# Patient Record
Sex: MALE | Race: WHITE | NOT HISPANIC OR LATINO | URBAN - METROPOLITAN AREA
[De-identification: names, ages, dates, MRNs, and addresses within clinical notes are randomized per-mention and may not be internally consistent; named-entity substitution may affect disease eponyms.]

---

## 2023-01-06 ENCOUNTER — INPATIENT (INPATIENT)
Facility: HOSPITAL | Age: 68
LOS: 4 days | Discharge: HOME | DRG: 690 | End: 2023-01-11
Attending: INTERNAL MEDICINE | Admitting: EMERGENCY MEDICINE
Payer: COMMERCIAL

## 2023-01-06 VITALS
TEMPERATURE: 98 F | HEART RATE: 68 BPM | OXYGEN SATURATION: 97 % | DIASTOLIC BLOOD PRESSURE: 79 MMHG | SYSTOLIC BLOOD PRESSURE: 155 MMHG | RESPIRATION RATE: 20 BRPM

## 2023-01-06 DIAGNOSIS — I25.10 ATHEROSCLEROTIC HEART DISEASE OF NATIVE CORONARY ARTERY WITHOUT ANGINA PECTORIS: ICD-10-CM

## 2023-01-06 DIAGNOSIS — N12 TUBULO-INTERSTITIAL NEPHRITIS, NOT SPECIFIED AS ACUTE OR CHRONIC: ICD-10-CM

## 2023-01-06 DIAGNOSIS — M54.9 DORSALGIA, UNSPECIFIED: ICD-10-CM

## 2023-01-06 LAB
ALBUMIN SERPL ELPH-MCNC: 4.1 G/DL — SIGNIFICANT CHANGE UP (ref 3.3–5)
ALP SERPL-CCNC: 66 U/L — SIGNIFICANT CHANGE UP (ref 40–120)
ALT FLD-CCNC: 34 U/L — SIGNIFICANT CHANGE UP (ref 12–78)
ANION GAP SERPL CALC-SCNC: 5 MMOL/L — SIGNIFICANT CHANGE UP (ref 5–17)
APPEARANCE UR: CLEAR — SIGNIFICANT CHANGE UP
AST SERPL-CCNC: 17 U/L — SIGNIFICANT CHANGE UP (ref 15–37)
BACTERIA # UR AUTO: ABNORMAL
BASOPHILS # BLD AUTO: 0.05 K/UL — SIGNIFICANT CHANGE UP (ref 0–0.2)
BASOPHILS NFR BLD AUTO: 0.5 % — SIGNIFICANT CHANGE UP (ref 0–2)
BILIRUB SERPL-MCNC: 1.1 MG/DL — SIGNIFICANT CHANGE UP (ref 0.2–1.2)
BILIRUB UR-MCNC: NEGATIVE — SIGNIFICANT CHANGE UP
BUN SERPL-MCNC: 21 MG/DL — SIGNIFICANT CHANGE UP (ref 7–23)
CALCIUM SERPL-MCNC: 9.4 MG/DL — SIGNIFICANT CHANGE UP (ref 8.5–10.1)
CHLORIDE SERPL-SCNC: 105 MMOL/L — SIGNIFICANT CHANGE UP (ref 96–108)
CO2 SERPL-SCNC: 29 MMOL/L — SIGNIFICANT CHANGE UP (ref 22–31)
COLOR SPEC: SIGNIFICANT CHANGE UP
CREAT SERPL-MCNC: 1.05 MG/DL — SIGNIFICANT CHANGE UP (ref 0.5–1.3)
DIFF PNL FLD: NEGATIVE — SIGNIFICANT CHANGE UP
EGFR: 77 ML/MIN/1.73M2 — SIGNIFICANT CHANGE UP
EOSINOPHIL # BLD AUTO: 0.2 K/UL — SIGNIFICANT CHANGE UP (ref 0–0.5)
EOSINOPHIL NFR BLD AUTO: 2.1 % — SIGNIFICANT CHANGE UP (ref 0–6)
EPI CELLS # UR: SIGNIFICANT CHANGE UP
FLUAV AG NPH QL: SIGNIFICANT CHANGE UP
FLUBV AG NPH QL: SIGNIFICANT CHANGE UP
GLUCOSE SERPL-MCNC: 113 MG/DL — HIGH (ref 70–99)
GLUCOSE UR QL: NEGATIVE — SIGNIFICANT CHANGE UP
HCT VFR BLD CALC: 49 % — SIGNIFICANT CHANGE UP (ref 39–50)
HGB BLD-MCNC: 16.4 G/DL — SIGNIFICANT CHANGE UP (ref 13–17)
IMM GRANULOCYTES NFR BLD AUTO: 0.4 % — SIGNIFICANT CHANGE UP (ref 0–0.9)
KETONES UR-MCNC: NEGATIVE — SIGNIFICANT CHANGE UP
LEUKOCYTE ESTERASE UR-ACNC: ABNORMAL
LIDOCAIN IGE QN: 94 U/L — SIGNIFICANT CHANGE UP (ref 73–393)
LYMPHOCYTES # BLD AUTO: 1.59 K/UL — SIGNIFICANT CHANGE UP (ref 1–3.3)
LYMPHOCYTES # BLD AUTO: 16.5 % — SIGNIFICANT CHANGE UP (ref 13–44)
MCHC RBC-ENTMCNC: 30.9 PG — SIGNIFICANT CHANGE UP (ref 27–34)
MCHC RBC-ENTMCNC: 33.5 GM/DL — SIGNIFICANT CHANGE UP (ref 32–36)
MCV RBC AUTO: 92.5 FL — SIGNIFICANT CHANGE UP (ref 80–100)
MONOCYTES # BLD AUTO: 0.67 K/UL — SIGNIFICANT CHANGE UP (ref 0–0.9)
MONOCYTES NFR BLD AUTO: 7 % — SIGNIFICANT CHANGE UP (ref 2–14)
NEUTROPHILS # BLD AUTO: 7.09 K/UL — SIGNIFICANT CHANGE UP (ref 1.8–7.4)
NEUTROPHILS NFR BLD AUTO: 73.5 % — SIGNIFICANT CHANGE UP (ref 43–77)
NITRITE UR-MCNC: NEGATIVE — SIGNIFICANT CHANGE UP
PH UR: 7 — SIGNIFICANT CHANGE UP (ref 5–8)
PLATELET # BLD AUTO: 195 K/UL — SIGNIFICANT CHANGE UP (ref 150–400)
POTASSIUM SERPL-MCNC: 4.5 MMOL/L — SIGNIFICANT CHANGE UP (ref 3.5–5.3)
POTASSIUM SERPL-SCNC: 4.5 MMOL/L — SIGNIFICANT CHANGE UP (ref 3.5–5.3)
PROT SERPL-MCNC: 8.1 GM/DL — SIGNIFICANT CHANGE UP (ref 6–8.3)
PROT UR-MCNC: NEGATIVE — SIGNIFICANT CHANGE UP
RBC # BLD: 5.3 M/UL — SIGNIFICANT CHANGE UP (ref 4.2–5.8)
RBC # FLD: 12.6 % — SIGNIFICANT CHANGE UP (ref 10.3–14.5)
RBC CASTS # UR COMP ASSIST: SIGNIFICANT CHANGE UP /HPF (ref 0–4)
RSV RNA NPH QL NAA+NON-PROBE: SIGNIFICANT CHANGE UP
SARS-COV-2 RNA SPEC QL NAA+PROBE: SIGNIFICANT CHANGE UP
SODIUM SERPL-SCNC: 139 MMOL/L — SIGNIFICANT CHANGE UP (ref 135–145)
SP GR SPEC: 1 — LOW (ref 1.01–1.02)
TROPONIN I, HIGH SENSITIVITY RESULT: 7.36 NG/L — SIGNIFICANT CHANGE UP
UROBILINOGEN FLD QL: NEGATIVE — SIGNIFICANT CHANGE UP
WBC # BLD: 9.64 K/UL — SIGNIFICANT CHANGE UP (ref 3.8–10.5)
WBC # FLD AUTO: 9.64 K/UL — SIGNIFICANT CHANGE UP (ref 3.8–10.5)
WBC UR QL: ABNORMAL /HPF (ref 0–5)

## 2023-01-06 PROCEDURE — 86901 BLOOD TYPING SEROLOGIC RH(D): CPT

## 2023-01-06 PROCEDURE — 99222 1ST HOSP IP/OBS MODERATE 55: CPT

## 2023-01-06 PROCEDURE — 93005 ELECTROCARDIOGRAM TRACING: CPT

## 2023-01-06 PROCEDURE — 74177 CT ABD & PELVIS W/CONTRAST: CPT | Mod: 26,MD

## 2023-01-06 PROCEDURE — 99285 EMERGENCY DEPT VISIT HI MDM: CPT

## 2023-01-06 PROCEDURE — 97116 GAIT TRAINING THERAPY: CPT | Mod: GP

## 2023-01-06 PROCEDURE — 86900 BLOOD TYPING SEROLOGIC ABO: CPT

## 2023-01-06 PROCEDURE — 0241U: CPT

## 2023-01-06 PROCEDURE — 97530 THERAPEUTIC ACTIVITIES: CPT | Mod: GP

## 2023-01-06 PROCEDURE — 72148 MRI LUMBAR SPINE W/O DYE: CPT | Mod: MA

## 2023-01-06 PROCEDURE — 85027 COMPLETE CBC AUTOMATED: CPT

## 2023-01-06 PROCEDURE — 80048 BASIC METABOLIC PNL TOTAL CA: CPT

## 2023-01-06 PROCEDURE — 85730 THROMBOPLASTIN TIME PARTIAL: CPT

## 2023-01-06 PROCEDURE — 36415 COLL VENOUS BLD VENIPUNCTURE: CPT

## 2023-01-06 PROCEDURE — 86850 RBC ANTIBODY SCREEN: CPT

## 2023-01-06 PROCEDURE — 85610 PROTHROMBIN TIME: CPT

## 2023-01-06 RX ORDER — METOPROLOL TARTRATE 50 MG
50 TABLET ORAL DAILY
Refills: 0 | Status: DISCONTINUED | OUTPATIENT
Start: 2023-01-06 | End: 2023-01-11

## 2023-01-06 RX ORDER — SODIUM CHLORIDE 9 MG/ML
1000 INJECTION INTRAMUSCULAR; INTRAVENOUS; SUBCUTANEOUS
Refills: 0 | Status: DISCONTINUED | OUTPATIENT
Start: 2023-01-06 | End: 2023-01-11

## 2023-01-06 RX ORDER — SODIUM CHLORIDE 9 MG/ML
1000 INJECTION INTRAMUSCULAR; INTRAVENOUS; SUBCUTANEOUS ONCE
Refills: 0 | Status: COMPLETED | OUTPATIENT
Start: 2023-01-06 | End: 2023-01-06

## 2023-01-06 RX ORDER — OXYCODONE HYDROCHLORIDE 5 MG/1
2.5 TABLET ORAL EVERY 4 HOURS
Refills: 0 | Status: DISCONTINUED | OUTPATIENT
Start: 2023-01-06 | End: 2023-01-08

## 2023-01-06 RX ORDER — INFLUENZA VIRUS VACCINE 15; 15; 15; 15 UG/.5ML; UG/.5ML; UG/.5ML; UG/.5ML
0.7 SUSPENSION INTRAMUSCULAR ONCE
Refills: 0 | Status: DISCONTINUED | OUTPATIENT
Start: 2023-01-06 | End: 2023-01-11

## 2023-01-06 RX ORDER — ACETAMINOPHEN 500 MG
650 TABLET ORAL EVERY 6 HOURS
Refills: 0 | Status: DISCONTINUED | OUTPATIENT
Start: 2023-01-06 | End: 2023-01-11

## 2023-01-06 RX ORDER — CEFTRIAXONE 500 MG/1
1000 INJECTION, POWDER, FOR SOLUTION INTRAMUSCULAR; INTRAVENOUS EVERY 24 HOURS
Refills: 0 | Status: DISCONTINUED | OUTPATIENT
Start: 2023-01-07 | End: 2023-01-11

## 2023-01-06 RX ORDER — NALOXONE HYDROCHLORIDE 4 MG/.1ML
0.4 SPRAY NASAL ONCE
Refills: 0 | Status: DISCONTINUED | OUTPATIENT
Start: 2023-01-06 | End: 2023-01-11

## 2023-01-06 RX ORDER — CEFTRIAXONE 500 MG/1
1000 INJECTION, POWDER, FOR SOLUTION INTRAMUSCULAR; INTRAVENOUS ONCE
Refills: 0 | Status: DISCONTINUED | OUTPATIENT
Start: 2023-01-06 | End: 2023-01-06

## 2023-01-06 RX ORDER — KETOROLAC TROMETHAMINE 30 MG/ML
30 SYRINGE (ML) INJECTION ONCE
Refills: 0 | Status: DISCONTINUED | OUTPATIENT
Start: 2023-01-06 | End: 2023-01-06

## 2023-01-06 RX ORDER — MORPHINE SULFATE 50 MG/1
4 CAPSULE, EXTENDED RELEASE ORAL ONCE
Refills: 0 | Status: DISCONTINUED | OUTPATIENT
Start: 2023-01-06 | End: 2023-01-06

## 2023-01-06 RX ORDER — ASPIRIN/CALCIUM CARB/MAGNESIUM 324 MG
81 TABLET ORAL DAILY
Refills: 0 | Status: DISCONTINUED | OUTPATIENT
Start: 2023-01-06 | End: 2023-01-08

## 2023-01-06 RX ORDER — POLYETHYLENE GLYCOL 3350 17 G/17G
17 POWDER, FOR SOLUTION ORAL DAILY
Refills: 0 | Status: DISCONTINUED | OUTPATIENT
Start: 2023-01-06 | End: 2023-01-11

## 2023-01-06 RX ORDER — AMLODIPINE BESYLATE 2.5 MG/1
5 TABLET ORAL DAILY
Refills: 0 | Status: DISCONTINUED | OUTPATIENT
Start: 2023-01-06 | End: 2023-01-11

## 2023-01-06 RX ORDER — CYCLOBENZAPRINE HYDROCHLORIDE 10 MG/1
5 TABLET, FILM COATED ORAL
Refills: 0 | Status: DISCONTINUED | OUTPATIENT
Start: 2023-01-06 | End: 2023-01-07

## 2023-01-06 RX ORDER — OXYCODONE HYDROCHLORIDE 5 MG/1
5 TABLET ORAL EVERY 4 HOURS
Refills: 0 | Status: DISCONTINUED | OUTPATIENT
Start: 2023-01-06 | End: 2023-01-08

## 2023-01-06 RX ORDER — LANOLIN ALCOHOL/MO/W.PET/CERES
3 CREAM (GRAM) TOPICAL AT BEDTIME
Refills: 0 | Status: DISCONTINUED | OUTPATIENT
Start: 2023-01-06 | End: 2023-01-11

## 2023-01-06 RX ORDER — ATORVASTATIN CALCIUM 80 MG/1
80 TABLET, FILM COATED ORAL AT BEDTIME
Refills: 0 | Status: DISCONTINUED | OUTPATIENT
Start: 2023-01-06 | End: 2023-01-11

## 2023-01-06 RX ORDER — KETOROLAC TROMETHAMINE 30 MG/ML
15 SYRINGE (ML) INJECTION EVERY 8 HOURS
Refills: 0 | Status: DISCONTINUED | OUTPATIENT
Start: 2023-01-06 | End: 2023-01-06

## 2023-01-06 RX ORDER — CEFTRIAXONE 500 MG/1
1000 INJECTION, POWDER, FOR SOLUTION INTRAMUSCULAR; INTRAVENOUS ONCE
Refills: 0 | Status: COMPLETED | OUTPATIENT
Start: 2023-01-06 | End: 2023-01-06

## 2023-01-06 RX ORDER — PANTOPRAZOLE SODIUM 20 MG/1
40 TABLET, DELAYED RELEASE ORAL
Refills: 0 | Status: DISCONTINUED | OUTPATIENT
Start: 2023-01-06 | End: 2023-01-11

## 2023-01-06 RX ORDER — ONDANSETRON 8 MG/1
4 TABLET, FILM COATED ORAL EVERY 8 HOURS
Refills: 0 | Status: DISCONTINUED | OUTPATIENT
Start: 2023-01-06 | End: 2023-01-11

## 2023-01-06 RX ORDER — SENNA PLUS 8.6 MG/1
2 TABLET ORAL AT BEDTIME
Refills: 0 | Status: DISCONTINUED | OUTPATIENT
Start: 2023-01-06 | End: 2023-01-11

## 2023-01-06 RX ORDER — LIDOCAINE 4 G/100G
1 CREAM TOPICAL DAILY
Refills: 0 | Status: DISCONTINUED | OUTPATIENT
Start: 2023-01-06 | End: 2023-01-11

## 2023-01-06 RX ORDER — METHOCARBAMOL 500 MG/1
750 TABLET, FILM COATED ORAL ONCE
Refills: 0 | Status: COMPLETED | OUTPATIENT
Start: 2023-01-06 | End: 2023-01-06

## 2023-01-06 RX ADMIN — AMLODIPINE BESYLATE 5 MILLIGRAM(S): 2.5 TABLET ORAL at 21:22

## 2023-01-06 RX ADMIN — SODIUM CHLORIDE 1000 MILLILITER(S): 9 INJECTION INTRAMUSCULAR; INTRAVENOUS; SUBCUTANEOUS at 13:16

## 2023-01-06 RX ADMIN — Medication 30 MILLIGRAM(S): at 14:06

## 2023-01-06 RX ADMIN — CEFTRIAXONE 1000 MILLIGRAM(S): 500 INJECTION, POWDER, FOR SOLUTION INTRAMUSCULAR; INTRAVENOUS at 15:45

## 2023-01-06 RX ADMIN — METHOCARBAMOL 750 MILLIGRAM(S): 500 TABLET, FILM COATED ORAL at 13:35

## 2023-01-06 RX ADMIN — SODIUM CHLORIDE 1000 MILLILITER(S): 9 INJECTION INTRAMUSCULAR; INTRAVENOUS; SUBCUTANEOUS at 12:16

## 2023-01-06 RX ADMIN — OXYCODONE HYDROCHLORIDE 5 MILLIGRAM(S): 5 TABLET ORAL at 21:22

## 2023-01-06 RX ADMIN — MORPHINE SULFATE 4 MILLIGRAM(S): 50 CAPSULE, EXTENDED RELEASE ORAL at 12:44

## 2023-01-06 RX ADMIN — MORPHINE SULFATE 4 MILLIGRAM(S): 50 CAPSULE, EXTENDED RELEASE ORAL at 12:14

## 2023-01-06 RX ADMIN — SODIUM CHLORIDE 125 MILLILITER(S): 9 INJECTION INTRAMUSCULAR; INTRAVENOUS; SUBCUTANEOUS at 15:55

## 2023-01-06 RX ADMIN — ATORVASTATIN CALCIUM 80 MILLIGRAM(S): 80 TABLET, FILM COATED ORAL at 21:23

## 2023-01-06 RX ADMIN — OXYCODONE HYDROCHLORIDE 5 MILLIGRAM(S): 5 TABLET ORAL at 22:30

## 2023-01-06 RX ADMIN — LIDOCAINE 1 PATCH: 4 CREAM TOPICAL at 21:23

## 2023-01-06 RX ADMIN — Medication 30 MILLIGRAM(S): at 13:36

## 2023-01-06 RX ADMIN — Medication 3 MILLIGRAM(S): at 21:24

## 2023-01-06 RX ADMIN — Medication 50 MILLIGRAM(S): at 21:22

## 2023-01-06 NOTE — ED ADULT TRIAGE NOTE - CHIEF COMPLAINT QUOTE
Pt arrives to ED complaining of non traumatic right lower back pain radiating down right leg for four days. Hx HTN.

## 2023-01-06 NOTE — ED STATDOCS - MUSCULOSKELETAL, MLM
range of motion is not limited. TTP to right gluteal area. range of motion is not limited. TTP to right gluteal area. 5/5 strength on flexion and extension of all limbs. No nuchal rigidity. No saddle anesthesia.

## 2023-01-06 NOTE — ED STATDOCS - OBJECTIVE STATEMENT
66 y/o male w/ a PMHx of HTN, HLD, MI, and CAD x4 presents to the ED c/o lower back pain radiating to the right leg, right abd, and groin x4 days starting after bending over to  something. Pt states his right leg feels stiff and since this morning he has difficulty ambulating secondary to the pain/stiffness.  Denies any trauma, fall, or injury. Pt reports he had similar pain before it has never been this severe. Pt endorses taking Alleve w/o improvement. Denies bladder/bowel incontinence, urinary Sx, focal weakness, CP, SOB, palpitations, or numbness. Pt able to range right leg. No other complaints at this time. Cardio: Dr. Solano in [place]. Allergies: Penicillin and Sulfa. 68 y/o male w/ a PMHx of HTN, HLD, MI, and CAD x4 presents to the ED c/o lower back pain, flank pain, radiating to the right leg, right abd, and groin x4 days starting after bending over to  something. Pt states his right leg feels stiff and since this morning he has difficulty ambulating secondary to the pain/stiffness.  Denies any trauma, fall, or injury. Pt reports he had similar pain before it has never been this severe. Pt endorses taking Alleve w/o improvement. Denies bladder/bowel incontinence, urinary Sx, focal weakness, CP, SOB, palpitations, or numbness. Pt able to range right leg. No other complaints at this time. Cardio: Dr. Solano in [place]. Allergies: Penicillin and Sulfa. flank pain is constant, not pleuritic, no cp, sob or palpitation, no neck pain, no visual or focal neurological complaints. No melena or hematochezia. No dysuria hematuria or frequency.

## 2023-01-06 NOTE — ED STATDOCS - DIFFERENTIAL DIAGNOSIS
UTI, pyelonephritis as causes. vs. muscular pain vs. neuropathic spinal pain, unable to ambulate without extreme pain, labs, CT, further pain management, resolution of pyelo potential ortho-spine eval. Differential Diagnosis

## 2023-01-06 NOTE — ED STATDOCS - NS ED ATTENDING STATEMENT MOD
This was a shared visit with the RITU. I reviewed and verified the documentation and independently performed the documented:

## 2023-01-06 NOTE — ED STATDOCS - CONSTITUTIONAL, MLM
normal... well appearing and in no apparent distress. well appearing and in no apparent distress. Nontoxic appearing. AAOx4. mild distress due to pain in the flank and back.

## 2023-01-06 NOTE — ED STATDOCS - NEUROLOGICAL, MLM
5/5 strength. No saddle anesthesia. 5/5 strength. No saddle anesthesia. CNs 2-12 intact. NIH=0 GCS=15 intact sensation and proprioception.

## 2023-01-06 NOTE — ED STATDOCS - ATTENDING APP SHARED VISIT CONTRIBUTION OF CARE
I Magen Cox MD saw and examined the patient. MLP saw and examined the patient under my supervision. I discussed the care of the patient with MLP and agree with MLP's plan, assessment and care of the patient while in the ED.

## 2023-01-06 NOTE — H&P ADULT - HISTORY OF PRESENT ILLNESS
66 y/o male w/ a PMHx of HTN, HLD, MI, and CAD x4 presents to the ED c/o lower back pain radiating to the right leg, right abd, and groin x4 days starting after bending over to  something. Pt states his right leg feels stiff and since this morning he has difficulty ambulating secondary to the pain/stiffness.  Denies any trauma, fall, or injury. Pt reports he had similar pain before it has never been this severe. Pt endorses taking Alleve w/o improvement. Denies bladder/bowel incontinence, urinary Sx, focal weakness, CP, SOB, palpitations, or numbness. Pt able to range right leg. No other complaints at this time. . Allergies: Penicillin and Sulfa 66 y/o male w/ a PMHx of HTN, HLD, MI, and CAD x4 presents to the ED c/o lower back pain radiating to the right leg, right abd, and groin x4 days starting after bending over to  something. Pt states his right leg feels stiff and since this morning he has difficulty ambulating secondary to the pain/stiffness.  Denies any trauma, fall, or injury. Pt reports he had similar pain before it has never been this severe. Pt endorses taking Alleve w/o improvement. Denies bladder/bowel incontinence, urinary Sx, focal extremity weakness, CP, SOB, palpitations, or numbness No other complaints at this time. . Allergies: Penicillin and Sulfa from childhood told by his mother

## 2023-01-06 NOTE — H&P ADULT - ASSESSMENT
Multiple comorbidities presents with back pain, found to have CT imaging suggestive for pyelonephritis    - admit to med surg  - IV antibiotics  - Encourage PO increased fluids  - Follow urine and blood cultures  - Pain management, work up wo continue as outpatient  - Continue home meds Multiple comorbidities presents with back pain Lumbar strain with sciatica, found to have CT imaging suggestive for pyelonephritis, no urinary symptoms    - Admit to med surg  - IV antibiotics   - Encourage PO increased fluids  - Follow urine and blood cultures  - Solumedrol, to taper, for sciatica  - Pain management, work up to continue as outpatient  - Continue home meds Multiple comorbidities presents with back pain Lumbar strain with sciatica, found to have CT imaging suggestive for pyelonephritis, no urinary symptoms    - Admit to med surg  - IV antibiotics Ceftriaxone  - Encourage PO increased fluids  - Follow urine and blood cultures  - Solumedrol, to taper, for sciatica  - Pain management med regimen ordered, work up to continue as outpatient  - Check Lumbar xray  - Continue home meds

## 2023-01-06 NOTE — ED ADULT NURSE NOTE - OBJECTIVE STATEMENT
patient axox3, c/o pain radiating from lower back to lower right leg. patient states he bend over and picked something up 5 days ago and has been having pain since. patient unable to ambulate due to pain. patient denies headache, vision changes, n/v/d, fever, chills, cough, chest pain, SOB. color good, skin warm and dry, respirations even and unlabored. patient able to speak in full sentences.

## 2023-01-06 NOTE — ED STATDOCS - CLINICAL SUMMARY MEDICAL DECISION MAKING FREE TEXT BOX
CT, labs, pain management, and reassess. Will r/o retroperitoneal bleeding, fracture, and appendicitis. CT, labs, pain management, and reassess. Will r/o retroperitoneal bleeding, fracture, and appendicitis.    UTI, pyelonephritis as causes. vs. muscular pain vs. neuropathic spinal pain, unable to ambulate without extreme pain, labs, CT, further pain management, resolution of pyelo potential ortho-spine eval.

## 2023-01-06 NOTE — H&P ADULT - NSICDXPASTMEDICALHX_GEN_ALL_CORE_FT
PAST MEDICAL HISTORY:  CAD (coronary artery disease)     HTN (hypertension)       HTN (hypertension)

## 2023-01-06 NOTE — ED STATDOCS - CARE PLAN
1 Principal Discharge DX:	Pyelonephritis  Secondary Diagnosis:	Back pain   Principal Discharge DX:	Pyelonephritis  Goal:	intractable flank pain, back pain, unable to ambulate  Secondary Diagnosis:	Back pain

## 2023-01-06 NOTE — H&P ADULT - NSHPLABSRESULTS_GEN_ALL_CORE
16.4   9.64  )-----------( 195      ( 2023 11:30 )             49.0     -    139  |  105  |  21  ----------------------------<  113<H>  4.5   |  29  |  1.05    Ca    9.4      2023 11:30    TPro  8.1  /  Alb  4.1  /  TBili  1.1  /  DBili  x   /  AST  17  /  ALT  34  /  AlkPhos  66  -06        LIVER FUNCTIONS - ( 2023 11:30 )  Alb: 4.1 g/dL / Pro: 8.1 gm/dL / ALK PHOS: 66 U/L / ALT: 34 U/L / AST: 17 U/L / GGT: x             Urinalysis Basic - ( 2023 13:24 )    Color: Pale Yellow / Appearance: Clear / S.005 / pH: x  Gluc: x / Ketone: Negative  / Bili: Negative / Urobili: Negative   Blood: x / Protein: Negative / Nitrite: Negative   Leuk Esterase: Moderate / RBC: 0-2 /HPF / WBC 26-50 /HPF   Sq Epi: x / Non Sq Epi: Occasional / Bacteria: Few          Blood, Urine: Negative ( @ 13:24)

## 2023-01-06 NOTE — ED STATDOCS - NEURO NEGATIVE STATEMENT, MLM
no loss of consciousness, no gait abnormality, no headache, no sensory deficits, and no weakness. No saddle anaesthesia. no incontinence of urine or stool.

## 2023-01-06 NOTE — H&P ADULT - NSHPPHYSICALEXAM_GEN_ALL_CORE
HEENT:   pupils equal and reactive, EOMI, no oropharyngeal lesions, erythema, exudates, oral thrush    NECK:   supple, no carotid bruits, no palpable lymph nodes, no thyromegaly    CV:  +S1, +S2, regular, no murmurs or rubs    RESP:   lungs clear to auscultation bilaterally, no wheezing, rales, rhonchi, good air entry bilaterally    BREAST:  not examined    GI:  abdomen soft, non-tender, non-distended, normal BS, no bruits, no abdominal masses, no palpable masses    RECTAL:  not examined    :  not examined    MSK:   normal muscle tone, no atrophy, no rigidity, no contractions    EXT:   no clubbing, no cyanosis, no edema, no calf pain, swelling or erythema    VASCULAR:  pulses equal and symmetric in the upper and lower extremities    NEURO:  AAOX3, no focal neurological deficits, follows all commands, able to move extremities spontaneously    SKIN:  no ulcers, lesions or rashes HEENT:   pupils equal and reactive, EOMI, no oropharyngeal lesions, erythema, exudates, oral thrush    NECK:   supple, no carotid bruits, no palpable lymph nodes, no thyromegaly    CV:  +S1, +S2, regular, no murmurs or rubs    RESP:   lungs clear to auscultation bilaterally, no wheezing, rales, rhonchi, good air entry bilaterally    BREAST:  not examined    GI:  abdomen soft, non-tender, non-distended, normal BS, no bruits, no abdominal masses, no palpable masses    RECTAL:  not examined    :  not examined    MSK:  tender L/S spine, SLR negative, DTR 2+ bilateral, no CVAT    EXT:   no clubbing, no cyanosis, no edema, no calf pain, swelling or erythema    VASCULAR:  pulses equal and symmetric in the upper and lower extremities    NEURO:  AAOX3, no focal neurological deficits, follows all commands, able to move extremities spontaneously    SKIN:  no ulcers, lesions or rashes

## 2023-01-06 NOTE — ED STATDOCS - GASTROINTESTINAL, MLM
abdomen soft, RLQ TTP, and non-distended. Bowel sounds present. abdomen soft, RLQ TTP, and non-distended. Bowel sounds present. Rt flank CVAT.

## 2023-01-06 NOTE — ED STATDOCS - PROGRESS NOTE DETAILS
pt having difficulty ambulating in the ed after pain meds, will treat for Pyelonephritis and control pain, will admit for further management. pt agrees with plan and well appearing on dc. -Dali Smith PA-C

## 2023-01-06 NOTE — ED STATDOCS - NSICDXPASTMEDICALHX_GEN_ALL_CORE_FT
PAST MEDICAL HISTORY:  HTN (hypertension)      PAST MEDICAL HISTORY:  CAD (coronary artery disease)     HTN (hypertension)       HTN (hypertension)

## 2023-01-06 NOTE — ED STATDOCS - NS_ ATTENDINGSCRIBEDETAILS _ED_A_ED_FT
I Magen Cox MD saw and examined the patient. Scribe documented for me and under my supervision. I have modified the scribe's documentation where necessary to reflect my history, physical exam and other relevant documentations pertinent to the care of the patient.

## 2023-01-07 LAB
BASOPHILS # BLD AUTO: 0.03 K/UL — SIGNIFICANT CHANGE UP (ref 0–0.2)
BASOPHILS NFR BLD AUTO: 0.4 % — SIGNIFICANT CHANGE UP (ref 0–2)
CULTURE RESULTS: NO GROWTH — SIGNIFICANT CHANGE UP
EOSINOPHIL # BLD AUTO: 0.2 K/UL — SIGNIFICANT CHANGE UP (ref 0–0.5)
EOSINOPHIL NFR BLD AUTO: 2.4 % — SIGNIFICANT CHANGE UP (ref 0–6)
HCT VFR BLD CALC: 41.9 % — SIGNIFICANT CHANGE UP (ref 39–50)
HCV AB S/CO SERPL IA: 0.09 S/CO — SIGNIFICANT CHANGE UP (ref 0–0.99)
HCV AB SERPL-IMP: SIGNIFICANT CHANGE UP
HGB BLD-MCNC: 13.7 G/DL — SIGNIFICANT CHANGE UP (ref 13–17)
IMM GRANULOCYTES NFR BLD AUTO: 0.2 % — SIGNIFICANT CHANGE UP (ref 0–0.9)
LYMPHOCYTES # BLD AUTO: 1.63 K/UL — SIGNIFICANT CHANGE UP (ref 1–3.3)
LYMPHOCYTES # BLD AUTO: 19.6 % — SIGNIFICANT CHANGE UP (ref 13–44)
MCHC RBC-ENTMCNC: 30.4 PG — SIGNIFICANT CHANGE UP (ref 27–34)
MCHC RBC-ENTMCNC: 32.7 GM/DL — SIGNIFICANT CHANGE UP (ref 32–36)
MCV RBC AUTO: 92.9 FL — SIGNIFICANT CHANGE UP (ref 80–100)
MONOCYTES # BLD AUTO: 0.86 K/UL — SIGNIFICANT CHANGE UP (ref 0–0.9)
MONOCYTES NFR BLD AUTO: 10.3 % — SIGNIFICANT CHANGE UP (ref 2–14)
NEUTROPHILS # BLD AUTO: 5.59 K/UL — SIGNIFICANT CHANGE UP (ref 1.8–7.4)
NEUTROPHILS NFR BLD AUTO: 67.1 % — SIGNIFICANT CHANGE UP (ref 43–77)
PLATELET # BLD AUTO: 180 K/UL — SIGNIFICANT CHANGE UP (ref 150–400)
RBC # BLD: 4.51 M/UL — SIGNIFICANT CHANGE UP (ref 4.2–5.8)
RBC # FLD: 12.9 % — SIGNIFICANT CHANGE UP (ref 10.3–14.5)
SPECIMEN SOURCE: SIGNIFICANT CHANGE UP
WBC # BLD: 8.33 K/UL — SIGNIFICANT CHANGE UP (ref 3.8–10.5)
WBC # FLD AUTO: 8.33 K/UL — SIGNIFICANT CHANGE UP (ref 3.8–10.5)

## 2023-01-07 PROCEDURE — 99233 SBSQ HOSP IP/OBS HIGH 50: CPT

## 2023-01-07 PROCEDURE — 72100 X-RAY EXAM L-S SPINE 2/3 VWS: CPT | Mod: 26

## 2023-01-07 RX ORDER — CYCLOBENZAPRINE HYDROCHLORIDE 10 MG/1
10 TABLET, FILM COATED ORAL THREE TIMES A DAY
Refills: 0 | Status: DISCONTINUED | OUTPATIENT
Start: 2023-01-07 | End: 2023-01-11

## 2023-01-07 RX ADMIN — LIDOCAINE 1 PATCH: 4 CREAM TOPICAL at 18:37

## 2023-01-07 RX ADMIN — PANTOPRAZOLE SODIUM 40 MILLIGRAM(S): 20 TABLET, DELAYED RELEASE ORAL at 05:14

## 2023-01-07 RX ADMIN — POLYETHYLENE GLYCOL 3350 17 GRAM(S): 17 POWDER, FOR SOLUTION ORAL at 09:41

## 2023-01-07 RX ADMIN — OXYCODONE HYDROCHLORIDE 5 MILLIGRAM(S): 5 TABLET ORAL at 04:00

## 2023-01-07 RX ADMIN — OXYCODONE HYDROCHLORIDE 5 MILLIGRAM(S): 5 TABLET ORAL at 21:48

## 2023-01-07 RX ADMIN — OXYCODONE HYDROCHLORIDE 5 MILLIGRAM(S): 5 TABLET ORAL at 05:00

## 2023-01-07 RX ADMIN — CEFTRIAXONE 1000 MILLIGRAM(S): 500 INJECTION, POWDER, FOR SOLUTION INTRAMUSCULAR; INTRAVENOUS at 05:13

## 2023-01-07 RX ADMIN — LIDOCAINE 1 PATCH: 4 CREAM TOPICAL at 12:04

## 2023-01-07 RX ADMIN — ATORVASTATIN CALCIUM 80 MILLIGRAM(S): 80 TABLET, FILM COATED ORAL at 21:45

## 2023-01-07 RX ADMIN — Medication 40 MILLIGRAM(S): at 09:40

## 2023-01-07 RX ADMIN — SENNA PLUS 2 TABLET(S): 8.6 TABLET ORAL at 21:45

## 2023-01-07 RX ADMIN — Medication 81 MILLIGRAM(S): at 09:41

## 2023-01-07 RX ADMIN — Medication 3 MILLIGRAM(S): at 21:45

## 2023-01-07 RX ADMIN — CYCLOBENZAPRINE HYDROCHLORIDE 10 MILLIGRAM(S): 10 TABLET, FILM COATED ORAL at 16:59

## 2023-01-07 RX ADMIN — AMLODIPINE BESYLATE 5 MILLIGRAM(S): 2.5 TABLET ORAL at 09:40

## 2023-01-07 RX ADMIN — CYCLOBENZAPRINE HYDROCHLORIDE 5 MILLIGRAM(S): 10 TABLET, FILM COATED ORAL at 03:52

## 2023-01-07 RX ADMIN — Medication 50 MILLIGRAM(S): at 09:41

## 2023-01-07 RX ADMIN — CYCLOBENZAPRINE HYDROCHLORIDE 10 MILLIGRAM(S): 10 TABLET, FILM COATED ORAL at 21:45

## 2023-01-07 RX ADMIN — OXYCODONE HYDROCHLORIDE 5 MILLIGRAM(S): 5 TABLET ORAL at 15:23

## 2023-01-07 NOTE — PROGRESS NOTE ADULT - ASSESSMENT
#Pyelonephritis   -Ceftriaxone   -UA: reviewed   -UCx: negative   -had enlarged prostate currently defers meds     #Back pain with sciatica   -Solumedrol   -Flexeril 10mg TID  -XR back: read pending   -PT consult       #CAD s/p stents   -ASA, statin, BB     #DVT prophylaxis   -encourage ambulation

## 2023-01-08 DIAGNOSIS — M54.41 LUMBAGO WITH SCIATICA, RIGHT SIDE: ICD-10-CM

## 2023-01-08 LAB
APTT BLD: 28.2 SEC — SIGNIFICANT CHANGE UP (ref 27.5–35.5)
INR BLD: 1.07 RATIO — SIGNIFICANT CHANGE UP (ref 0.88–1.16)
PROTHROM AB SERPL-ACNC: 12.4 SEC — SIGNIFICANT CHANGE UP (ref 10.5–13.4)

## 2023-01-08 PROCEDURE — 99233 SBSQ HOSP IP/OBS HIGH 50: CPT

## 2023-01-08 PROCEDURE — 71045 X-RAY EXAM CHEST 1 VIEW: CPT | Mod: 26

## 2023-01-08 PROCEDURE — 93010 ELECTROCARDIOGRAM REPORT: CPT

## 2023-01-08 RX ORDER — ASPIRIN/CALCIUM CARB/MAGNESIUM 324 MG
81 TABLET ORAL DAILY
Refills: 0 | Status: DISCONTINUED | OUTPATIENT
Start: 2023-01-08 | End: 2023-01-11

## 2023-01-08 RX ORDER — HYDROMORPHONE HYDROCHLORIDE 2 MG/ML
2 INJECTION INTRAMUSCULAR; INTRAVENOUS; SUBCUTANEOUS
Refills: 0 | Status: DISCONTINUED | OUTPATIENT
Start: 2023-01-08 | End: 2023-01-11

## 2023-01-08 RX ORDER — SODIUM CHLORIDE 9 MG/ML
1000 INJECTION INTRAMUSCULAR; INTRAVENOUS; SUBCUTANEOUS
Refills: 0 | Status: DISCONTINUED | OUTPATIENT
Start: 2023-01-08 | End: 2023-01-09

## 2023-01-08 RX ORDER — OXYCODONE HYDROCHLORIDE 5 MG/1
5 TABLET ORAL
Refills: 0 | Status: DISCONTINUED | OUTPATIENT
Start: 2023-01-08 | End: 2023-01-11

## 2023-01-08 RX ORDER — OXYCODONE HYDROCHLORIDE 5 MG/1
10 TABLET ORAL
Refills: 0 | Status: DISCONTINUED | OUTPATIENT
Start: 2023-01-08 | End: 2023-01-11

## 2023-01-08 RX ADMIN — SENNA PLUS 2 TABLET(S): 8.6 TABLET ORAL at 21:44

## 2023-01-08 RX ADMIN — Medication 50 MILLIGRAM(S): at 09:24

## 2023-01-08 RX ADMIN — Medication 40 MILLIGRAM(S): at 09:24

## 2023-01-08 RX ADMIN — OXYCODONE HYDROCHLORIDE 5 MILLIGRAM(S): 5 TABLET ORAL at 11:57

## 2023-01-08 RX ADMIN — PANTOPRAZOLE SODIUM 40 MILLIGRAM(S): 20 TABLET, DELAYED RELEASE ORAL at 06:03

## 2023-01-08 RX ADMIN — ATORVASTATIN CALCIUM 80 MILLIGRAM(S): 80 TABLET, FILM COATED ORAL at 21:43

## 2023-01-08 RX ADMIN — CYCLOBENZAPRINE HYDROCHLORIDE 10 MILLIGRAM(S): 10 TABLET, FILM COATED ORAL at 21:43

## 2023-01-08 RX ADMIN — OXYCODONE HYDROCHLORIDE 5 MILLIGRAM(S): 5 TABLET ORAL at 22:12

## 2023-01-08 RX ADMIN — Medication 81 MILLIGRAM(S): at 09:24

## 2023-01-08 RX ADMIN — OXYCODONE HYDROCHLORIDE 5 MILLIGRAM(S): 5 TABLET ORAL at 21:42

## 2023-01-08 RX ADMIN — CEFTRIAXONE 1000 MILLIGRAM(S): 500 INJECTION, POWDER, FOR SOLUTION INTRAMUSCULAR; INTRAVENOUS at 06:03

## 2023-01-08 RX ADMIN — AMLODIPINE BESYLATE 5 MILLIGRAM(S): 2.5 TABLET ORAL at 09:24

## 2023-01-08 RX ADMIN — CYCLOBENZAPRINE HYDROCHLORIDE 10 MILLIGRAM(S): 10 TABLET, FILM COATED ORAL at 12:10

## 2023-01-08 NOTE — PHYSICAL THERAPY INITIAL EVALUATION ADULT - PERTINENT HX OF CURRENT PROBLEM, REHAB EVAL
68 y/o male w/ a PMHx of HTN, HLD, MI, and CAD x4 presents to the ED c/o lower back pain radiating to the right leg, right abd, and groin x4 days starting after bending over to  something. Pt states his right leg feels stiff and since this morning he has difficulty ambulating secondary to the pain/stiffness. Pt. s/p pyelonephritis/ intractable back pain, sciatica. CT ABDOMEN AND PELVIS: No hydronephrosis or renal stone.

## 2023-01-08 NOTE — PHYSICAL THERAPY INITIAL EVALUATION ADULT - ADDITIONAL COMMENTS
Pt. lives in pvt home with his wife and has 2 steps to enter + HR and full flight inside + HR. Pt. was IND with ADL's and amb w/o AD + work.

## 2023-01-08 NOTE — CONSULT NOTE ADULT - SUBJECTIVE AND OBJECTIVE BOX
Patient is a 67yMale community ambulator with/without assistive devices who presents to [facility] 1/6 and admitted for intractable back pain and pyelonephritis and currently on Ceftriaxone. Patient states he was bending over on Friday and has he began to bend he felt a twinge in his left back and was not able to finish bending due to the pain. Since that time he states he has had pain down the right leg to the knee and this has limited his ability to ambulate. States pain worsens when he lays flat or stand straight up. Has been able to use a walker to ambulate during this admission but only to and from the bed to bathroom. Denies falls/trauma. States he has on and off "sciatica" type pain down the right leg approximately 3-4 times in 10 years and it was always relieved with Motrin. This pain however is more severe and has not been relieved with flexeril/solumedrol/morphine while here. Denies pain down the left leg, denies numbness/tingling/paresthesias/weakness, denies incontinence of bowel/bladder.  Denies having any other pain elsewhere. Denies any previous orthopaedic history. No other orthopaedic concerns at this time.     PAST MEDICAL & SURGICAL HISTORY:  CAD (coronary artery disease)      HTN (hypertension)          Vital Signs Last 24 Hrs  T(C): 36.8 (08 Jan 2023 08:10), Max: 36.8 (07 Jan 2023 16:54)  T(F): 98.3 (08 Jan 2023 08:10), Max: 98.3 (07 Jan 2023 16:54)  HR: 70 (08 Jan 2023 08:10) (70 - 87)  BP: 121/69 (08 Jan 2023 08:10) (107/52 - 138/66)  BP(mean): --  RR: 16 (08 Jan 2023 08:10) (16 - 18)  SpO2: 95% (08 Jan 2023 08:10) (91% - 95%)    Parameters below as of 08 Jan 2023 08:10  Patient On (Oxygen Delivery Method): room air      I&O's Detail      LABS:                        13.7   8.33  )-----------( 180      ( 07 Jan 2023 07:19 )             41.9         PHYSICAL EXAM:  General; Awake and alert, Oriented x 3  Spine: No TTP over spinous processes or paraspinal muscles at C/T/L spine. No palpable step off.     Able to actively SLR BL. No pain to passive SLR  Ambulating w/  assistance/pain     Motor exam:        C5       C6       C7       C8       T1   R  5/5      5/5     5/5     5/5      5/5  L   5/5      5/5     5/5     5/5      5/5         L2        L3       L4       L5      S1  R  5/5      5/5     5/5     5/5    5/5  L   5/5     5/5      5/5     5/5    5/5    Sensory:  (0=absent, 1=impaired, 2=normal, NT=not testable)      C5    C6   C7   C8   T1         R   2     2      2     2      2  L    2     2      2     2      2        L2    L3   L4   L5   S1   R   2     2     2     2     2  L    2     2     2     2     2    Right Upper extremity:   Negative Horne  +Rad Pulse  Compartments soft and compressible    Left Upper extremity:   Negative Horne  +Rad Pulse  Compartments soft and compressible    Right Lower Extremity:   SILT L2-S1  Negative Clonus  Negative Babinski  +DP  Compartments soft and compressible           Left Lower Extremity:  SILT L2-S1  Negative Clonus   Negative Babinski  +DP  Compartments soft and compressible    Secondary  Skin intact. No erythema/ecchymosis. No TTP over bony prominences, SILT, palpable pulses, full/painless A/PROM, compartments soft. No other injuries or complaints. Negative logroll/heelstrike BL.     Imaging:  XR of the L spine shows no acute fracture/dislocation                                           A/P :   Patient is a 67yMale w/ RLE radicular low back pain     -Clinical presentation and physical exam are not consistent w/ acute cord compression/cauda equina. Does not demonstrate red flag symptoms such as bowel/bladder incontinence, saddle anesthesia, fevers/chills, or weight loss.  -Patient was extensively educated about the signs and symptoms of acute cord compression. The patient was advised to return to the medical team should he develop neurological symptoms such as weakness, numbness/tingling/paresthesias, worsening pain, bowel/bladder incontinence, or fevers/chills.  -Recommend weight loss/core strengthening for improved back health.  -MR of the L spine with sedation due to patient difficulty lying flat and claustrophobia  -Dilaudid 2mg q3prn severe, oxy 10 q3 mod, oxy 5 prn mild; flexeril for pain control  -NPO at midnight in case of need for surgery pending MRI results   -IVF while NPO  Pyonephritis treatment per medical team   -Hold AC in case of need for OR 1/9  -Medical optimization documentation  -Imaging and clinical presentation discussed w/ Dr. Bazzi who is aware and agrees w/ above plan.     Patient is a 67yMale community ambulator without assistive devices who presents to [facility] 1/6 and admitted for intractable back pain and pyelonephritis and currently on Ceftriaxone. Patient states he was bending over on Friday and has he began to bend he felt a twinge in his left back and was not able to finish bending due to the pain. Since that time he states he has had pain down the right leg to the knee and this has limited his ability to ambulate. States pain worsens when he lays flat or stand straight up. Has been able to use a walker to ambulate during this admission but only to and from the bed to bathroom. Denies falls/trauma. States he has on and off "sciatica" type pain down the right leg approximately 3-4 times in 10 years and it was always relieved with Motrin. This pain however is more severe and has not been relieved with flexeril/solumedrol/morphine while here. Denies pain down the left leg, denies numbness/tingling/paresthesias/weakness, denies incontinence of bowel/bladder.  Denies having any other pain elsewhere. Denies any previous orthopaedic history. No other orthopaedic concerns at this time.     PAST MEDICAL & SURGICAL HISTORY:  CAD (coronary artery disease)      HTN (hypertension)          Vital Signs Last 24 Hrs  T(C): 36.8 (08 Jan 2023 08:10), Max: 36.8 (07 Jan 2023 16:54)  T(F): 98.3 (08 Jan 2023 08:10), Max: 98.3 (07 Jan 2023 16:54)  HR: 70 (08 Jan 2023 08:10) (70 - 87)  BP: 121/69 (08 Jan 2023 08:10) (107/52 - 138/66)  BP(mean): --  RR: 16 (08 Jan 2023 08:10) (16 - 18)  SpO2: 95% (08 Jan 2023 08:10) (91% - 95%)    Parameters below as of 08 Jan 2023 08:10  Patient On (Oxygen Delivery Method): room air      I&O's Detail      LABS:                        13.7   8.33  )-----------( 180      ( 07 Jan 2023 07:19 )             41.9         PHYSICAL EXAM:  General; Awake and alert, Oriented x 3  Spine: No TTP over spinous processes or paraspinal muscles at C/T/L spine. No palpable step off.     Able to actively SLR BL. No pain to passive SLR  Ambulating w/  assistance/pain     Motor exam:        C5       C6       C7       C8       T1   R  5/5      5/5     5/5     5/5      5/5  L   5/5      5/5     5/5     5/5      5/5         L2        L3       L4       L5      S1  R  5/5      5/5     5/5     5/5    5/5  L   5/5     5/5      5/5     5/5    5/5    Sensory:  (0=absent, 1=impaired, 2=normal, NT=not testable)      C5    C6   C7   C8   T1         R   2     2      2     2      2  L    2     2      2     2      2        L2    L3   L4   L5   S1   R   2     2     2     2     2  L    2     2     2     2     2    Right Upper extremity:   Negative Horne  +Rad Pulse  Compartments soft and compressible    Left Upper extremity:   Negative Horne  +Rad Pulse  Compartments soft and compressible    Right Lower Extremity:   SILT L2-S1  Negative Clonus  Negative Babinski  +DP  Compartments soft and compressible           Left Lower Extremity:  SILT L2-S1  Negative Clonus   Negative Babinski  +DP  Compartments soft and compressible    Secondary  Skin intact. No erythema/ecchymosis. No TTP over bony prominences, SILT, palpable pulses, full/painless A/PROM, compartments soft. No other injuries or complaints. Negative logroll/heelstrike BL.     Imaging:  XR of the L spine shows no acute fracture/dislocation                                           A/P :   Patient is a 67yMale w/ RLE radicular low back pain     -Clinical presentation and physical exam are not consistent w/ acute cord compression/cauda equina. Does not demonstrate red flag symptoms such as bowel/bladder incontinence, saddle anesthesia, fevers/chills, or weight loss.  -Patient was extensively educated about the signs and symptoms of acute cord compression. The patient was advised to return to the medical team should he develop neurological symptoms such as weakness, numbness/tingling/paresthesias, worsening pain, bowel/bladder incontinence, or fevers/chills.  -Recommend weight loss/core strengthening for improved back health.  -MR of the L spine with sedation due to patient difficulty lying flat and claustrophobia  -Dilaudid 2mg q3prn severe, oxy 10 q3 mod, oxy 5 prn mild; flexeril for pain control  -NPO at midnight in case of need for surgery pending MRI results   -IVF while NPO  Pyonephritis treatment per medical team   -Hold AC in case of need for OR 1/9  -Medical optimization documentation  -Imaging and clinical presentation discussed w/ Dr. Bazzi who is aware and agrees w/ above plan.

## 2023-01-08 NOTE — PROGRESS NOTE ADULT - ASSESSMENT
#Pyelonephritis   -Ceftriaxone   -UA: reviewed   -UCx: negative   -BCx: NGTD   -had enlarged prostate currently defers meds     #Back pain with sciatica   -Solumedrol   -Flexeril 10mg TID PRN   -warm and cold compresses   -minimal improvement in pain   -XR back: read pending   -PT consult and recommendations noted  -ortho spine consult     #CAD s/p stents   -ASA, statin, BB     #DVT prophylaxis   -encourage ambulation

## 2023-01-08 NOTE — CONSULT NOTE ADULT - PROBLEM SELECTOR RECOMMENDATION 9
full note dictated  needs pain meds  needs mri  very claustrophobic  will need mri under sedation    all questions answered

## 2023-01-08 NOTE — CONSULT NOTE ADULT - SUBJECTIVE AND OBJECTIVE BOX
67 y o w male w 6 day hx of severe intractable rt fem radic  unresponsive to steroids iv    full note dictated      + rt fem stretch     neuro intact

## 2023-01-09 DIAGNOSIS — N12 TUBULO-INTERSTITIAL NEPHRITIS, NOT SPECIFIED AS ACUTE OR CHRONIC: ICD-10-CM

## 2023-01-09 LAB
ABO RH CONFIRMATION: SIGNIFICANT CHANGE UP
ANION GAP SERPL CALC-SCNC: 5 MMOL/L — SIGNIFICANT CHANGE UP (ref 5–17)
APTT BLD: 26.4 SEC — LOW (ref 27.5–35.5)
BUN SERPL-MCNC: 31 MG/DL — HIGH (ref 7–23)
CALCIUM SERPL-MCNC: 8.7 MG/DL — SIGNIFICANT CHANGE UP (ref 8.5–10.1)
CHLORIDE SERPL-SCNC: 109 MMOL/L — HIGH (ref 96–108)
CO2 SERPL-SCNC: 28 MMOL/L — SIGNIFICANT CHANGE UP (ref 22–31)
CREAT SERPL-MCNC: 1.01 MG/DL — SIGNIFICANT CHANGE UP (ref 0.5–1.3)
EGFR: 81 ML/MIN/1.73M2 — SIGNIFICANT CHANGE UP
FLUAV AG NPH QL: SIGNIFICANT CHANGE UP
FLUBV AG NPH QL: SIGNIFICANT CHANGE UP
GLUCOSE SERPL-MCNC: 100 MG/DL — HIGH (ref 70–99)
HCT VFR BLD CALC: 41.6 % — SIGNIFICANT CHANGE UP (ref 39–50)
HGB BLD-MCNC: 13.6 G/DL — SIGNIFICANT CHANGE UP (ref 13–17)
INR BLD: 1.13 RATIO — SIGNIFICANT CHANGE UP (ref 0.88–1.16)
MCHC RBC-ENTMCNC: 30.1 PG — SIGNIFICANT CHANGE UP (ref 27–34)
MCHC RBC-ENTMCNC: 32.7 GM/DL — SIGNIFICANT CHANGE UP (ref 32–36)
MCV RBC AUTO: 92 FL — SIGNIFICANT CHANGE UP (ref 80–100)
PLATELET # BLD AUTO: 189 K/UL — SIGNIFICANT CHANGE UP (ref 150–400)
POTASSIUM SERPL-MCNC: 3.7 MMOL/L — SIGNIFICANT CHANGE UP (ref 3.5–5.3)
POTASSIUM SERPL-SCNC: 3.7 MMOL/L — SIGNIFICANT CHANGE UP (ref 3.5–5.3)
PROTHROM AB SERPL-ACNC: 13.1 SEC — SIGNIFICANT CHANGE UP (ref 10.5–13.4)
RBC # BLD: 4.52 M/UL — SIGNIFICANT CHANGE UP (ref 4.2–5.8)
RBC # FLD: 12.8 % — SIGNIFICANT CHANGE UP (ref 10.3–14.5)
RSV RNA NPH QL NAA+NON-PROBE: SIGNIFICANT CHANGE UP
SARS-COV-2 RNA SPEC QL NAA+PROBE: SIGNIFICANT CHANGE UP
SODIUM SERPL-SCNC: 142 MMOL/L — SIGNIFICANT CHANGE UP (ref 135–145)
WBC # BLD: 11.75 K/UL — HIGH (ref 3.8–10.5)
WBC # FLD AUTO: 11.75 K/UL — HIGH (ref 3.8–10.5)

## 2023-01-09 PROCEDURE — 72148 MRI LUMBAR SPINE W/O DYE: CPT | Mod: 26

## 2023-01-09 PROCEDURE — 99233 SBSQ HOSP IP/OBS HIGH 50: CPT

## 2023-01-09 RX ORDER — METOPROLOL TARTRATE 50 MG
1 TABLET ORAL
Qty: 0 | Refills: 0 | DISCHARGE

## 2023-01-09 RX ORDER — ONDANSETRON 8 MG/1
4 TABLET, FILM COATED ORAL ONCE
Refills: 0 | Status: DISCONTINUED | OUTPATIENT
Start: 2023-01-09 | End: 2023-01-09

## 2023-01-09 RX ORDER — OXYCODONE HYDROCHLORIDE 5 MG/1
5 TABLET ORAL ONCE
Refills: 0 | Status: DISCONTINUED | OUTPATIENT
Start: 2023-01-09 | End: 2023-01-09

## 2023-01-09 RX ORDER — AMLODIPINE BESYLATE 2.5 MG/1
1 TABLET ORAL
Qty: 0 | Refills: 0 | DISCHARGE

## 2023-01-09 RX ORDER — ATORVASTATIN CALCIUM 80 MG/1
1 TABLET, FILM COATED ORAL
Qty: 0 | Refills: 0 | DISCHARGE

## 2023-01-09 RX ORDER — FENTANYL CITRATE 50 UG/ML
50 INJECTION INTRAVENOUS
Refills: 0 | Status: DISCONTINUED | OUTPATIENT
Start: 2023-01-09 | End: 2023-01-09

## 2023-01-09 RX ORDER — PANTOPRAZOLE SODIUM 20 MG/1
1 TABLET, DELAYED RELEASE ORAL
Qty: 0 | Refills: 0 | DISCHARGE

## 2023-01-09 RX ORDER — SODIUM CHLORIDE 9 MG/ML
1000 INJECTION, SOLUTION INTRAVENOUS
Refills: 0 | Status: DISCONTINUED | OUTPATIENT
Start: 2023-01-09 | End: 2023-01-09

## 2023-01-09 RX ORDER — CHOLECALCIFEROL (VITAMIN D3) 125 MCG
1 CAPSULE ORAL
Qty: 0 | Refills: 0 | DISCHARGE

## 2023-01-09 RX ORDER — EZETIMIBE 10 MG/1
1 TABLET ORAL
Qty: 0 | Refills: 0 | DISCHARGE

## 2023-01-09 RX ORDER — ASPIRIN/CALCIUM CARB/MAGNESIUM 324 MG
1 TABLET ORAL
Qty: 0 | Refills: 0 | DISCHARGE

## 2023-01-09 RX ADMIN — OXYCODONE HYDROCHLORIDE 5 MILLIGRAM(S): 5 TABLET ORAL at 21:23

## 2023-01-09 RX ADMIN — SODIUM CHLORIDE 125 MILLILITER(S): 9 INJECTION INTRAMUSCULAR; INTRAVENOUS; SUBCUTANEOUS at 00:14

## 2023-01-09 RX ADMIN — SODIUM CHLORIDE 125 MILLILITER(S): 9 INJECTION INTRAMUSCULAR; INTRAVENOUS; SUBCUTANEOUS at 08:38

## 2023-01-09 RX ADMIN — OXYCODONE HYDROCHLORIDE 5 MILLIGRAM(S): 5 TABLET ORAL at 11:25

## 2023-01-09 RX ADMIN — Medication 3 MILLIGRAM(S): at 21:24

## 2023-01-09 RX ADMIN — PANTOPRAZOLE SODIUM 40 MILLIGRAM(S): 20 TABLET, DELAYED RELEASE ORAL at 06:06

## 2023-01-09 RX ADMIN — AMLODIPINE BESYLATE 5 MILLIGRAM(S): 2.5 TABLET ORAL at 10:23

## 2023-01-09 RX ADMIN — SENNA PLUS 2 TABLET(S): 8.6 TABLET ORAL at 21:24

## 2023-01-09 RX ADMIN — Medication 50 MILLIGRAM(S): at 10:25

## 2023-01-09 RX ADMIN — OXYCODONE HYDROCHLORIDE 5 MILLIGRAM(S): 5 TABLET ORAL at 10:25

## 2023-01-09 RX ADMIN — CEFTRIAXONE 1000 MILLIGRAM(S): 500 INJECTION, POWDER, FOR SOLUTION INTRAMUSCULAR; INTRAVENOUS at 06:08

## 2023-01-09 RX ADMIN — CYCLOBENZAPRINE HYDROCHLORIDE 10 MILLIGRAM(S): 10 TABLET, FILM COATED ORAL at 06:27

## 2023-01-09 RX ADMIN — ATORVASTATIN CALCIUM 80 MILLIGRAM(S): 80 TABLET, FILM COATED ORAL at 21:23

## 2023-01-09 RX ADMIN — CYCLOBENZAPRINE HYDROCHLORIDE 10 MILLIGRAM(S): 10 TABLET, FILM COATED ORAL at 21:23

## 2023-01-09 RX ADMIN — Medication 81 MILLIGRAM(S): at 10:23

## 2023-01-09 RX ADMIN — OXYCODONE HYDROCHLORIDE 5 MILLIGRAM(S): 5 TABLET ORAL at 21:53

## 2023-01-09 NOTE — PROGRESS NOTE ADULT - ASSESSMENT
66 y/o male w/ a PMHx of HTN, HLD, MI, and CAD x4 presents to the ED c/o lower back pain radiating to the right leg, right abd, and groin on 1/3/23 starting after bending over to  something. Pt states his right leg feels stiff and on the day of admission he has difficulty ambulating secondary to the pain/stiffness.  Denies any trauma, fall, or injury. Pt reports he had similar pain before it has never been this severe. Pt endorses taking Alleve w/o improvement. Denies bladder/bowel incontinence, urinary Sx, focal extremity weakness, CP, SOB, palpitations, or numbness No other complaints at this time.    IMP:  R femoral radiculopathy  Pyelonephritis    PLAN:  Patient admitted to Medicine  Analgesia prn  Lidoderm patch  Patient NPO for MRI with sedation today  IVAB for pyelo  OOB as tolerated

## 2023-01-09 NOTE — PROGRESS NOTE ADULT - ASSESSMENT
#Pyelonephritis   -Ceftriaxone   -UA: reviewed   -UCx: negative   -BCx: NGTD   -had enlarged prostate currently defers meds     #Back pain with sciatica   -s/p Solumedrol   -pain management   -warm and cold compresses   -minimal improvement in pain   -XR back: read pending  -MRI pending   -PT consult and recommendations noted  -ortho spine consult and recommendations noted    #CAD s/p stents   -ASA, statin, BB     #DVT prophylaxis   -encourage ambulation    #Pyelonephritis   -Ceftriaxone   -UA: reviewed   -UCx: negative   -BCx: NGTD   -had enlarged prostate currently defers meds     #Back pain with sciatica   -s/p Solumedrol   -pain management   -warm and cold compresses   -minimal improvement in pain   -XR back: read pending  -MRI pending   -PT consult and recommendations noted  -ortho spine consult and recommendations noted    #Leukocytosis   -likely secondary to Solumedrol     #CAD s/p stents   -ASA, statin, BB     #DVT prophylaxis   -encourage ambulation

## 2023-01-09 NOTE — PROVIDER CONTACT NOTE (OTHER) - SITUATION
notified Dr Cesar's office of admission please fax over discharge paperwork once patient is discharged to [phone]

## 2023-01-10 PROCEDURE — 99233 SBSQ HOSP IP/OBS HIGH 50: CPT

## 2023-01-10 RX ADMIN — OXYCODONE HYDROCHLORIDE 5 MILLIGRAM(S): 5 TABLET ORAL at 06:25

## 2023-01-10 RX ADMIN — CEFTRIAXONE 1000 MILLIGRAM(S): 500 INJECTION, POWDER, FOR SOLUTION INTRAMUSCULAR; INTRAVENOUS at 05:55

## 2023-01-10 RX ADMIN — OXYCODONE HYDROCHLORIDE 5 MILLIGRAM(S): 5 TABLET ORAL at 22:26

## 2023-01-10 RX ADMIN — OXYCODONE HYDROCHLORIDE 5 MILLIGRAM(S): 5 TABLET ORAL at 13:27

## 2023-01-10 RX ADMIN — OXYCODONE HYDROCHLORIDE 5 MILLIGRAM(S): 5 TABLET ORAL at 21:56

## 2023-01-10 RX ADMIN — CYCLOBENZAPRINE HYDROCHLORIDE 10 MILLIGRAM(S): 10 TABLET, FILM COATED ORAL at 14:38

## 2023-01-10 RX ADMIN — CYCLOBENZAPRINE HYDROCHLORIDE 10 MILLIGRAM(S): 10 TABLET, FILM COATED ORAL at 21:55

## 2023-01-10 RX ADMIN — Medication 81 MILLIGRAM(S): at 09:37

## 2023-01-10 RX ADMIN — OXYCODONE HYDROCHLORIDE 5 MILLIGRAM(S): 5 TABLET ORAL at 14:27

## 2023-01-10 RX ADMIN — OXYCODONE HYDROCHLORIDE 5 MILLIGRAM(S): 5 TABLET ORAL at 05:55

## 2023-01-10 RX ADMIN — Medication 50 MILLIGRAM(S): at 09:37

## 2023-01-10 RX ADMIN — AMLODIPINE BESYLATE 5 MILLIGRAM(S): 2.5 TABLET ORAL at 09:37

## 2023-01-10 RX ADMIN — CYCLOBENZAPRINE HYDROCHLORIDE 10 MILLIGRAM(S): 10 TABLET, FILM COATED ORAL at 09:37

## 2023-01-10 RX ADMIN — ATORVASTATIN CALCIUM 80 MILLIGRAM(S): 80 TABLET, FILM COATED ORAL at 21:55

## 2023-01-10 RX ADMIN — PANTOPRAZOLE SODIUM 40 MILLIGRAM(S): 20 TABLET, DELAYED RELEASE ORAL at 05:55

## 2023-01-10 NOTE — PROGRESS NOTE ADULT - ASSESSMENT
#Pyelonephritis   -Continue IV Ceftriaxone for one more day  -UA: reviewed   -UCx: negative   -BCx: NGTD   -had enlarged prostate currently defers meds     #Back pain with sciatica   -s/p Solumedrol   -pain management   -warm and cold compresses   -minimal improvement in pain   -XR back: read pending  -MRI reviewed with patient   -PT consult and recommendations noted  -ortho spine consult and recommendations noted  -Possible outpatient steroid injection-patient preferred     #Leukocytosis   -likely secondary to Solumedrol     #CAD s/p stents   -ASA, statin, BB     #DVT prophylaxis   -encourage ambulation     Disposition: possibly home in 1 or 2 days

## 2023-01-11 VITALS
HEART RATE: 78 BPM | DIASTOLIC BLOOD PRESSURE: 64 MMHG | SYSTOLIC BLOOD PRESSURE: 124 MMHG | TEMPERATURE: 98 F | RESPIRATION RATE: 18 BRPM | OXYGEN SATURATION: 98 %

## 2023-01-11 LAB
CULTURE RESULTS: SIGNIFICANT CHANGE UP
CULTURE RESULTS: SIGNIFICANT CHANGE UP
SPECIMEN SOURCE: SIGNIFICANT CHANGE UP
SPECIMEN SOURCE: SIGNIFICANT CHANGE UP

## 2023-01-11 PROCEDURE — 99239 HOSP IP/OBS DSCHRG MGMT >30: CPT

## 2023-01-11 RX ADMIN — AMLODIPINE BESYLATE 5 MILLIGRAM(S): 2.5 TABLET ORAL at 09:48

## 2023-01-11 RX ADMIN — CEFTRIAXONE 1000 MILLIGRAM(S): 500 INJECTION, POWDER, FOR SOLUTION INTRAMUSCULAR; INTRAVENOUS at 05:45

## 2023-01-11 RX ADMIN — OXYCODONE HYDROCHLORIDE 5 MILLIGRAM(S): 5 TABLET ORAL at 06:15

## 2023-01-11 RX ADMIN — Medication 81 MILLIGRAM(S): at 09:47

## 2023-01-11 RX ADMIN — Medication 50 MILLIGRAM(S): at 09:47

## 2023-01-11 RX ADMIN — CYCLOBENZAPRINE HYDROCHLORIDE 10 MILLIGRAM(S): 10 TABLET, FILM COATED ORAL at 05:45

## 2023-01-11 RX ADMIN — PANTOPRAZOLE SODIUM 40 MILLIGRAM(S): 20 TABLET, DELAYED RELEASE ORAL at 05:45

## 2023-01-11 RX ADMIN — OXYCODONE HYDROCHLORIDE 5 MILLIGRAM(S): 5 TABLET ORAL at 05:45

## 2023-01-11 NOTE — PROGRESS NOTE ADULT - SUBJECTIVE AND OBJECTIVE BOX
HOSPITALIST ATTENDING PROGRESS NOTE     Chart and meds reviewed. Patient seen and examined     CC: Pyelo, back pain     Subjective: Still with back pain, improved. Tolerating abx     10/8: +mild improvement in symptoms but with still significant pain     All other systems and founds to be negative with exception of what has been described above.         PHYSICAL EXAM:  Vital Signs Last 24 Hrs  T(C): 36.8 (08 Jan 2023 08:10), Max: 36.8 (07 Jan 2023 16:54)  T(F): 98.3 (08 Jan 2023 08:10), Max: 98.3 (07 Jan 2023 16:54)  HR: 70 (08 Jan 2023 08:10) (70 - 87)  BP: 121/69 (08 Jan 2023 08:10) (107/52 - 138/66)  RR: 16 (08 Jan 2023 08:10) (16 - 18)  SpO2: 95% (08 Jan 2023 08:10) (91% - 95%)    Parameters below as of 08 Jan 2023 08:10  Patient On (Oxygen Delivery Method): room air            GEN: NAD   HEENT: EOMI, normal hearing, moist mucous membranes  NECK : Soft and supple, no JVD  LUNG: CTABL, No wheezing, rales or rhonchi  CVS: S1S2+, RRR, no M/G/R  GI: BS+, soft, NT/ND, no guarding, no rebound  EXTREMITIES: No peripheral edema  VASCULAR: 2+ peripheral pulses  NEURO: AAOx3, grossly non-focal   MSK: 5/5 strength b/l upper and lower extremities, +ttp over the lower back, +SLR   SKIN: No rashes    MEDICATIONS:  MEDICATIONS  (STANDING):  amLODIPine   Tablet 5 milliGRAM(s) Oral daily  aspirin enteric coated 81 milliGRAM(s) Oral daily  atorvastatin 80 milliGRAM(s) Oral at bedtime  cefTRIAXone Injectable. 1000 milliGRAM(s) IV Push every 24 hours  influenza  Vaccine (HIGH DOSE) 0.7 milliLiter(s) IntraMuscular once  lidocaine   4% Patch 1 Patch Transdermal daily  metoprolol succinate ER 50 milliGRAM(s) Oral daily  naloxone Injectable 0.4 milliGRAM(s) IV Push once  pantoprazole    Tablet 40 milliGRAM(s) Oral before breakfast  polyethylene glycol 3350 17 Gram(s) Oral daily  senna 2 Tablet(s) Oral at bedtime  sodium chloride 0.9%. 1000 milliLiter(s) (125 mL/Hr) IV Continuous <Continuous>    MEDICATIONS  (PRN):  acetaminophen     Tablet .. 650 milliGRAM(s) Oral every 6 hours PRN Mild Pain (1 - 3)  aluminum hydroxide/magnesium hydroxide/simethicone Suspension 30 milliLiter(s) Oral every 4 hours PRN Dyspepsia  bisacodyl 5 milliGRAM(s) Oral daily PRN Constipation  cyclobenzaprine 10 milliGRAM(s) Oral three times a day PRN Muscle Spasm  melatonin 3 milliGRAM(s) Oral at bedtime PRN Insomnia  ondansetron Injectable 4 milliGRAM(s) IV Push every 8 hours PRN Nausea and/or Vomiting  oxyCODONE    IR 2.5 milliGRAM(s) Oral every 4 hours PRN Moderate Pain (4 - 6)  oxyCODONE    IR 5 milliGRAM(s) Oral every 4 hours PRN Severe Pain (7 - 10)      LABS: All Labs Reviewed:                        13.7   8.33  )-----------( 180      ( 07 Jan 2023 07:19 )             41.9              Culture - Blood (01.06.23 @ 14:59)    Specimen Source: .Blood Blood    Culture Results:   No growth to date.    Culture - Blood (01.06.23 @ 14:59)    Specimen Source: .Blood None    Culture Results:   No growth to date.    Urinalysis (01.06.23 @ 13:24)    Glucose Qualitative, Urine: Negative    Blood, Urine: Negative    pH Urine: 7.0    Color: Pale Yellow    Urine Appearance: Clear    Bilirubin: Negative    Ketone - Urine: Negative    Specific Gravity: 1.005    Protein, Urine: Negative    Urobilinogen: Negative    Nitrite: Negative    Leukocyte Esterase Concentration: Moderate              I&O's Summary    CAPILLARY BLOOD GLUCOSE          RADIOLOGY/EKG:   < from: CT Abdomen and Pelvis w/ IV Cont (01.06.23 @ 12:10) >  LOWER CHEST: Trace bibasilar atelectasis.    LIVER: Within normal limits.  BILE DUCTS: Normal caliber.  GALLBLADDER: Within normal limits.  SPLEEN: Within normal limits.  PANCREAS: Within normal limits.  ADRENALS: Within normal limits.  KIDNEYS/URETERS: Trace nonspecific perinephric stranding.    BLADDER: Question asymmetric thickening of the base of the urinary   bladder.  REPRODUCTIVE ORGANS: Enlarged, heterogeneous prostate gland.    BOWEL: No bowel obstruction. Colonic diverticulosis. Appendix is normal.  PERITONEUM: No ascites.  VESSELS: Atherosclerotic changes.  RETROPERITONEUM/LYMPH NODES: No lymphadenopathy.  ABDOMINAL WALL: Within normal limits.  BONES: Within normal limits.    IMPRESSION:  Trace nonspecific perinephric stranding. No hydronephrosis or renal stone.    Enlarged, heterogeneous prostate gland. With questionable asymmetric   thickening at the base of the urinary bladder. Correlation with   urinalysis and follow-up urology consultation are recommended.
HOSPITALIST ATTENDING PROGRESS NOTE     Chart and meds reviewed. Patient seen and examined     CC: Pyelo, back pain     Subjective: Still with back pain, improved. Tolerating abx       All other systems and founds to be negative with exception of what has been described above.         PHYSICAL EXAM:  Vital Signs Last 24 Hrs  T(C): 36.8 (07 Jan 2023 16:54), Max: 36.8 (06 Jan 2023 20:57)  T(F): 98.3 (07 Jan 2023 16:54), Max: 98.3 (07 Jan 2023 04:22)  HR: 87 (07 Jan 2023 16:54) (57 - 87)  BP: 138/66 (07 Jan 2023 16:54) (100/54 - 163/69)  RR: 18 (07 Jan 2023 16:54) (16 - 18)  SpO2: 91% (07 Jan 2023 16:54) (91% - 98%)    Parameters below as of 07 Jan 2023 16:54  Patient On (Oxygen Delivery Method): room air        GEN: NAD   HEENT: EOMI, normal hearing, moist mucous membranes  NECK : Soft and supple, no JVD  LUNG: CTABL, No wheezing, rales or rhonchi  CVS: S1S2+, RRR, no M/G/R  GI: BS+, soft, NT/ND, no guarding, no rebound  EXTREMITIES: No peripheral edema  VASCULAR: 2+ peripheral pulses  NEURO: AAOx3, grossly non-focal   MSK: 5/5 strength b/l upper and lower extremities, +ttp over the lower back, +SLR   SKIN: No rashes    MEDICATIONS:  MEDICATIONS  (STANDING):  amLODIPine   Tablet 5 milliGRAM(s) Oral daily  aspirin enteric coated 81 milliGRAM(s) Oral daily  atorvastatin 80 milliGRAM(s) Oral at bedtime  cefTRIAXone Injectable. 1000 milliGRAM(s) IV Push every 24 hours  influenza  Vaccine (HIGH DOSE) 0.7 milliLiter(s) IntraMuscular once  lidocaine   4% Patch 1 Patch Transdermal daily  methylPREDNISolone sodium succinate Injectable 40 milliGRAM(s) IV Push daily  metoprolol succinate ER 50 milliGRAM(s) Oral daily  naloxone Injectable 0.4 milliGRAM(s) IV Push once  pantoprazole    Tablet 40 milliGRAM(s) Oral before breakfast  polyethylene glycol 3350 17 Gram(s) Oral daily  senna 2 Tablet(s) Oral at bedtime  sodium chloride 0.9%. 1000 milliLiter(s) (125 mL/Hr) IV Continuous <Continuous>      LABS: All Labs Reviewed:                        13.7   8.33  )-----------( 180      ( 07 Jan 2023 07:19 )             41.9     01-06    139  |  105  |  21  ----------------------------<  113<H>  4.5   |  29  |  1.05    Ca    9.4      06 Jan 2023 11:30    TPro  8.1  /  Alb  4.1  /  TBili  1.1  /  DBili  x   /  AST  17  /  ALT  34  /  AlkPhos  66  01-06          Blood Culture: 01-06 @ 13:24  Organism --  Gram Stain Blood -- Gram Stain --  Specimen Source Clean Catch None  Culture-Blood --      I&O's Summary    CAPILLARY BLOOD GLUCOSE          RADIOLOGY/EKG:   < from: CT Abdomen and Pelvis w/ IV Cont (01.06.23 @ 12:10) >  LOWER CHEST: Trace bibasilar atelectasis.    LIVER: Within normal limits.  BILE DUCTS: Normal caliber.  GALLBLADDER: Within normal limits.  SPLEEN: Within normal limits.  PANCREAS: Within normal limits.  ADRENALS: Within normal limits.  KIDNEYS/URETERS: Trace nonspecific perinephric stranding.    BLADDER: Question asymmetric thickening of the base of the urinary   bladder.  REPRODUCTIVE ORGANS: Enlarged, heterogeneous prostate gland.    BOWEL: No bowel obstruction. Colonic diverticulosis. Appendix is normal.  PERITONEUM: No ascites.  VESSELS: Atherosclerotic changes.  RETROPERITONEUM/LYMPH NODES: No lymphadenopathy.  ABDOMINAL WALL: Within normal limits.  BONES: Within normal limits.    IMPRESSION:  Trace nonspecific perinephric stranding. No hydronephrosis or renal stone.    Enlarged, heterogeneous prostate gland. With questionable asymmetric   thickening at the base of the urinary bladder. Correlation with   urinalysis and follow-up urology consultation are recommended.
HOSPITALIST ATTENDING PROGRESS NOTE     Chart and meds reviewed. Patient seen and examined     CC: Pyelo, back pain     Subjective: Still with back pain, improved. Tolerating abx   10/8: +mild improvement in symptoms but with still significant pain   10/9: Pain slightly improved. MRI w/ anesthesia today   01/10/23: Patient seen and examined. Still with back pain. Discussed with patient and wife at bed side regarding management and d/c plan.     All other systems and founds to be negative with exception of what has been described above.         Vital Signs Last 24 Hrs  T(C): 36.6 (10 Richard 2023 07:56), Max: 36.8 (09 Jan 2023 21:18)  T(F): 97.9 (10 Richard 2023 07:56), Max: 98.3 (09 Jan 2023 21:18)  HR: 72 (10 Richard 2023 09:29) (61 - 72)  BP: 131/76 (10 Richard 2023 09:29) (102/55 - 131/76)  BP(mean): 71 (09 Jan 2023 21:18) (71 - 71)  RR: 18 (10 Richard 2023 07:56) (18 - 18)  SpO2: 92% (10 Richard 2023 07:56) (92% - 93%)    Parameters below as of 10 Richard 2023 07:56  Patient On (Oxygen Delivery Method): room air      PHYSICAL EXAM:    GEN: NAD   HEENT: EOMI, normal hearing, moist mucous membranes  NECK : Soft and supple, no JVD  LUNG: CTABL, No wheezing, rales or rhonchi  CVS: S1S2+, RRR, no M/G/R  GI: BS+, soft, NT/ND, no guarding, no rebound  EXTREMITIES: No peripheral edema  VASCULAR: 2+ peripheral pulses  NEURO: AAOx3, grossly non-focal   MSK: 5/5 strength b/l upper and lower extremities, +ttp over the lower back, +SLR   SKIN: No rashes    MEDICATIONS:  MEDICATIONS  (STANDING):  amLODIPine   Tablet 5 milliGRAM(s) Oral daily  aspirin enteric coated 81 milliGRAM(s) Oral daily  atorvastatin 80 milliGRAM(s) Oral at bedtime  cefTRIAXone Injectable. 1000 milliGRAM(s) IV Push every 24 hours  influenza  Vaccine (HIGH DOSE) 0.7 milliLiter(s) IntraMuscular once  lidocaine   4% Patch 1 Patch Transdermal daily  metoprolol succinate ER 50 milliGRAM(s) Oral daily  naloxone Injectable 0.4 milliGRAM(s) IV Push once  pantoprazole    Tablet 40 milliGRAM(s) Oral before breakfast  polyethylene glycol 3350 17 Gram(s) Oral daily  senna 2 Tablet(s) Oral at bedtime  sodium chloride 0.9%. 1000 milliLiter(s) (125 mL/Hr) IV Continuous <Continuous>  sodium chloride 0.9%. 1000 milliLiter(s) (125 mL/Hr) IV Continuous <Continuous>    MEDICATIONS  (PRN):  acetaminophen     Tablet .. 650 milliGRAM(s) Oral every 6 hours PRN Mild Pain (1 - 3)  aluminum hydroxide/magnesium hydroxide/simethicone Suspension 30 milliLiter(s) Oral every 4 hours PRN Dyspepsia  bisacodyl 5 milliGRAM(s) Oral daily PRN Constipation  cyclobenzaprine 10 milliGRAM(s) Oral three times a day PRN Muscle Spasm  HYDROmorphone   Tablet 2 milliGRAM(s) Oral every 3 hours PRN Severe Pain (7 - 10)  melatonin 3 milliGRAM(s) Oral at bedtime PRN Insomnia  ondansetron Injectable 4 milliGRAM(s) IV Push every 8 hours PRN Nausea and/or Vomiting  oxyCODONE    IR 5 milliGRAM(s) Oral every 3 hours PRN Mild Pain (1 - 3)  oxyCODONE    IR 10 milliGRAM(s) Oral every 3 hours PRN Moderate Pain (4 - 6)        LABS: All Labs Reviewed:                            13.6   11.75 )-----------( 189      ( 09 Jan 2023 08:39 )             41.6     09 Jan 2023 08:39    142    |  109    |  31     ----------------------------<  100    3.7     |  28     |  1.01     Ca    8.7        09 Jan 2023 08:39        PT/INR - ( 09 Jan 2023 08:39 )   PT: 13.1 sec;   INR: 1.13 ratio         PTT - ( 09 Jan 2023 08:39 )  PTT:26.4 sec  CAPILLARY BLOOD GLUCOSE            
HOSPITALIST ATTENDING PROGRESS NOTE     Chart and meds reviewed. Patient seen and examined     CC: Pyelo, back pain     Subjective: Still with back pain, improved. Tolerating abx     10/8: +mild improvement in symptoms but with still significant pain     10/9: Pain slightly improved. MRI w/ anesthesia today     All other systems and founds to be negative with exception of what has been described above.         PHYSICAL EXAM:  Vital Signs Last 24 Hrs  T(C): 36.6 (09 Jan 2023 09:28), Max: 37.2 (08 Jan 2023 16:47)  T(F): 97.8 (09 Jan 2023 09:28), Max: 99 (08 Jan 2023 16:47)  HR: 73 (09 Jan 2023 09:28) (67 - 82)  BP: 138/62 (09 Jan 2023 09:28) (130/60 - 139/65)  BP(mean): 76 (08 Jan 2023 21:55) (76 - 76)  RR: 18 (09 Jan 2023 09:28) (18 - 18)  SpO2: 94% (09 Jan 2023 09:28) (93% - 94%)    Parameters below as of 09 Jan 2023 09:28  Patient On (Oxygen Delivery Method): room air          GEN: NAD   HEENT: EOMI, normal hearing, moist mucous membranes  NECK : Soft and supple, no JVD  LUNG: CTABL, No wheezing, rales or rhonchi  CVS: S1S2+, RRR, no M/G/R  GI: BS+, soft, NT/ND, no guarding, no rebound  EXTREMITIES: No peripheral edema  VASCULAR: 2+ peripheral pulses  NEURO: AAOx3, grossly non-focal   MSK: 5/5 strength b/l upper and lower extremities, +ttp over the lower back, +SLR   SKIN: No rashes    MEDICATIONS:  MEDICATIONS  (STANDING):  amLODIPine   Tablet 5 milliGRAM(s) Oral daily  aspirin enteric coated 81 milliGRAM(s) Oral daily  atorvastatin 80 milliGRAM(s) Oral at bedtime  cefTRIAXone Injectable. 1000 milliGRAM(s) IV Push every 24 hours  influenza  Vaccine (HIGH DOSE) 0.7 milliLiter(s) IntraMuscular once  lidocaine   4% Patch 1 Patch Transdermal daily  metoprolol succinate ER 50 milliGRAM(s) Oral daily  naloxone Injectable 0.4 milliGRAM(s) IV Push once  pantoprazole    Tablet 40 milliGRAM(s) Oral before breakfast  polyethylene glycol 3350 17 Gram(s) Oral daily  senna 2 Tablet(s) Oral at bedtime  sodium chloride 0.9%. 1000 milliLiter(s) (125 mL/Hr) IV Continuous <Continuous>  sodium chloride 0.9%. 1000 milliLiter(s) (125 mL/Hr) IV Continuous <Continuous>    MEDICATIONS  (PRN):  acetaminophen     Tablet .. 650 milliGRAM(s) Oral every 6 hours PRN Mild Pain (1 - 3)  aluminum hydroxide/magnesium hydroxide/simethicone Suspension 30 milliLiter(s) Oral every 4 hours PRN Dyspepsia  bisacodyl 5 milliGRAM(s) Oral daily PRN Constipation  cyclobenzaprine 10 milliGRAM(s) Oral three times a day PRN Muscle Spasm  HYDROmorphone   Tablet 2 milliGRAM(s) Oral every 3 hours PRN Severe Pain (7 - 10)  melatonin 3 milliGRAM(s) Oral at bedtime PRN Insomnia  ondansetron Injectable 4 milliGRAM(s) IV Push every 8 hours PRN Nausea and/or Vomiting  oxyCODONE    IR 5 milliGRAM(s) Oral every 3 hours PRN Mild Pain (1 - 3)  oxyCODONE    IR 10 milliGRAM(s) Oral every 3 hours PRN Moderate Pain (4 - 6)        LABS: All Labs Reviewed:                        13.7   8.33  )-----------( 180      ( 07 Jan 2023 07:19 )             41.9              Culture - Blood (01.06.23 @ 14:59)    Specimen Source: .Blood Blood    Culture Results:   No growth to date.    Culture - Blood (01.06.23 @ 14:59)    Specimen Source: .Blood None    Culture Results:   No growth to date.    Urinalysis (01.06.23 @ 13:24)    Glucose Qualitative, Urine: Negative    Blood, Urine: Negative    pH Urine: 7.0    Color: Pale Yellow    Urine Appearance: Clear    Bilirubin: Negative    Ketone - Urine: Negative    Specific Gravity: 1.005    Protein, Urine: Negative    Urobilinogen: Negative    Nitrite: Negative    Leukocyte Esterase Concentration: Moderate              I&O's Summary    CAPILLARY BLOOD GLUCOSE          RADIOLOGY/EKG:   < from: CT Abdomen and Pelvis w/ IV Cont (01.06.23 @ 12:10) >  LOWER CHEST: Trace bibasilar atelectasis.    LIVER: Within normal limits.  BILE DUCTS: Normal caliber.  GALLBLADDER: Within normal limits.  SPLEEN: Within normal limits.  PANCREAS: Within normal limits.  ADRENALS: Within normal limits.  KIDNEYS/URETERS: Trace nonspecific perinephric stranding.    BLADDER: Question asymmetric thickening of the base of the urinary   bladder.  REPRODUCTIVE ORGANS: Enlarged, heterogeneous prostate gland.    BOWEL: No bowel obstruction. Colonic diverticulosis. Appendix is normal.  PERITONEUM: No ascites.  VESSELS: Atherosclerotic changes.  RETROPERITONEUM/LYMPH NODES: No lymphadenopathy.  ABDOMINAL WALL: Within normal limits.  BONES: Within normal limits.    IMPRESSION:  Trace nonspecific perinephric stranding. No hydronephrosis or renal stone.    Enlarged, heterogeneous prostate gland. With questionable asymmetric   thickening at the base of the urinary bladder. Correlation with   urinalysis and follow-up urology consultation are recommended.
rt leg pain persists  cannot lie flat  nv intact  + fem stretch rt  + pain rt ant thigh on left hip flexion  oob  nv intact  
HPI:  68 y/o male w/ a PMHx of HTN, HLD, MI, and CAD x4 presents to the ED c/o lower back pain radiating to the right leg, right abd, and groin on 1/3/23 starting after bending over to  something. Pt states his right leg feels stiff and on the day of admission he has difficulty ambulating secondary to the pain/stiffness.  Denies any trauma, fall, or injury. Pt reports he had similar pain before it has never been this severe. Pt endorses taking Alleve w/o improvement. Denies bladder/bowel incontinence, urinary Sx, focal extremity weakness, CP, SOB, palpitations, or numbness No other complaints at this time.  Allergies: Penicillin and Sulfa from childhood told by his mother (06 Jan 2023 18:35)    1/9/23 Patient with persistent R lateral thigh pain. NPO awaiting MRI with sedation    PAST MEDICAL & SURGICAL HISTORY:  CAD (coronary artery disease)      HTN (hypertension)    MEDICATIONS  (STANDING):  amLODIPine   Tablet 5 milliGRAM(s) Oral daily  aspirin enteric coated 81 milliGRAM(s) Oral daily  atorvastatin 80 milliGRAM(s) Oral at bedtime  cefTRIAXone Injectable. 1000 milliGRAM(s) IV Push every 24 hours  influenza  Vaccine (HIGH DOSE) 0.7 milliLiter(s) IntraMuscular once  lidocaine   4% Patch 1 Patch Transdermal daily  metoprolol succinate ER 50 milliGRAM(s) Oral daily  naloxone Injectable 0.4 milliGRAM(s) IV Push once  pantoprazole    Tablet 40 milliGRAM(s) Oral before breakfast  polyethylene glycol 3350 17 Gram(s) Oral daily  senna 2 Tablet(s) Oral at bedtime  sodium chloride 0.9%. 1000 milliLiter(s) (125 mL/Hr) IV Continuous <Continuous>  sodium chloride 0.9%. 1000 milliLiter(s) (125 mL/Hr) IV Continuous <Continuous>    MEDICATIONS  (PRN):  acetaminophen     Tablet .. 650 milliGRAM(s) Oral every 6 hours PRN Mild Pain (1 - 3)  aluminum hydroxide/magnesium hydroxide/simethicone Suspension 30 milliLiter(s) Oral every 4 hours PRN Dyspepsia  bisacodyl 5 milliGRAM(s) Oral daily PRN Constipation  cyclobenzaprine 10 milliGRAM(s) Oral three times a day PRN Muscle Spasm  HYDROmorphone   Tablet 2 milliGRAM(s) Oral every 3 hours PRN Severe Pain (7 - 10)  melatonin 3 milliGRAM(s) Oral at bedtime PRN Insomnia  ondansetron Injectable 4 milliGRAM(s) IV Push every 8 hours PRN Nausea and/or Vomiting  oxyCODONE    IR 5 milliGRAM(s) Oral every 3 hours PRN Mild Pain (1 - 3)  oxyCODONE    IR 10 milliGRAM(s) Oral every 3 hours PRN Moderate Pain (4 - 6)    Allergies    penicillins (Unknown)  sulfa drugs (Unknown)    Intolerances    PE:    Vital Signs Last 24 Hrs  T(C): 36.9 (08 Jan 2023 21:55), Max: 37.2 (08 Jan 2023 16:47)  T(F): 98.5 (08 Jan 2023 21:55), Max: 99 (08 Jan 2023 16:47)  HR: 67 (08 Jan 2023 21:55) (67 - 82)  BP: 130/60 (08 Jan 2023 21:55) (130/60 - 139/65)  BP(mean): 76 (08 Jan 2023 21:55) (76 - 76)  RR: 18 (08 Jan 2023 21:55) (18 - 18)  SpO2: 94% (08 Jan 2023 21:55) (93% - 94%)    Parameters below as of 08 Jan 2023 21:55  Patient On (Oxygen Delivery Method): room air    Patient sitting upright in bed with c/o R lateral thigh pain to knee  Lidoderm patch over R lateral thigh  No local tenderness over GTB  No pain with ROM R hip  (+) R fem stretch/negative L fem stretch  No motor weakness LEs    LABS    Complete Blood Count + Automated Diff (01.07.23 @ 07:19)    WBC Count: 8.33 K/uL    RBC Count: 4.51 M/uL    Hemoglobin: 13.7 g/dL    Hematocrit: 41.9 %    Mean Cell Volume: 92.9 fl    Mean Cell Hemoglobin: 30.4 pg    Mean Cell Hemoglobin Conc: 32.7 gm/dL    Red Cell Distrib Width: 12.9 %    Platelet Count - Automated: 180 K/uL    Auto Neutrophil #: 5.59 K/uL    Auto Lymphocyte #: 1.63 K/uL    Auto Monocyte #: 0.86 K/uL    Auto Eosinophil #: 0.20 K/uL    Auto Basophil #: 0.03 K/uL    Auto Neutrophil %: 67.1: Differential percentages must be correlated with absolute numbers for  clinical significance. %    Auto Lymphocyte %: 19.6 %    Auto Monocyte %: 10.3 %    Auto Eosinophil %: 2.4 %    Auto Basophil %: 0.4 %    Auto Immature Granulocyte %: 0.2: (Includes meta, myelo and promyelocytes). Mild elevations in immature  granulocytes may be seen with many inflammatory processes and pregnancy;  clinical correlation suggested. %    Comprehensive Metabolic Panel (01.06.23 @ 11:30)    Sodium, Serum: 139 mmol/L    Potassium, Serum: 4.5 mmol/L    Chloride, Serum: 105 mmol/L    Carbon Dioxide, Serum: 29 mmol/L    Anion Gap, Serum: 5 mmol/L    Blood Urea Nitrogen, Serum: 21 mg/dL    Creatinine, Serum: 1.05 mg/dL    Glucose, Serum: 113 mg/dL    Calcium, Total Serum: 9.4 mg/dL    Protein Total, Serum: 8.1 gm/dL    Albumin, Serum: 4.1 g/dL    Bilirubin Total, Serum: 1.1 mg/dL    Alkaline Phosphatase, Serum: 66 U/L    Aspartate Aminotransferase (AST/SGOT): 17 U/L    Alanine Aminotransferase (ALT/SGPT): 34 U/L    eGFR: 77: The estimated glomerular filtration rate (eGFR) is calculated using the  2021 CKD-EPI creatinine equation, which does not have a coefficient for  race and is validated in individuals 18 years of age and older (N Engl J  Med 2021; 385:4580-0556). Creatinine-based eGFR may be inaccurate in  various situations including but not limited to extremes of muscle mass,  altered dietary protein intake, or medications that affect renal tubular  creatinine secretion. mL/min/1.73m2    STUDIES  Negative for spine pathology
pt w rt ant thigh pain  mri looks like extraforaminal disc HNP rt L23
intractable rt fem radic  MRI + HNP Rt L23 extraforaminal      + fem stretch right  unable to lie flat    motor intact

## 2023-01-11 NOTE — DISCHARGE NOTE NURSING/CASE MANAGEMENT/SOCIAL WORK - PATIENT PORTAL LINK FT
You can access the FollowMyHealth Patient Portal offered by [facility] by registering at the following website: [email or web address]. By joining [facility]’s FollowMyHealth portal, you will also be able to view your health information using other applications (apps) compatible with our system.

## 2023-01-11 NOTE — DISCHARGE NOTE PROVIDER - CARE PROVIDER_API CALL
Rey Bazzi)  Orthopaedic Surgery  [address] [place] [address]  Phone: [phone]  Fax: [phone]  Follow Up Time:

## 2023-01-11 NOTE — DISCHARGE NOTE NURSING/CASE MANAGEMENT/SOCIAL WORK - NSDCPEFALRISK_GEN_ALL_CORE
For information on Fall & Injury Prevention, visit: [email or web address] OR  [email or web address] OR  https://www.cdc.gov/steadi/patient.html

## 2023-01-11 NOTE — PROGRESS NOTE ADULT - PROBLEM SELECTOR PLAN 1
options for treatment discussed w pt  he just received his last dose of IV abx for pyelonephritis  will be dc'd home this am   see Dr Wise in the office for outpt injection      all questions answered    f/u w me next week
options for treatment discussed w  pt  he has already tried IV steroids w/o relief  can try foraminal nerve block for HNP or consider surgery    he is interested in nerve block/epidural  risks benefits alternatives discussed w pt    all questions answered      can have it done in hosp or as an outpt  he wishes to  proceed with this as an outpt  left message w dr Shah to dc pt to home w pain meds and then see Dr butler and myself as an outpt  all questions answered  discussed w wife today as well  he will receive one more dose of IV abx tomorrow and then go to office for eval w dr BUTLER    and f/u as outpt with me and dr BUTLER
will discuss w pt consideration towards injection or surgery

## 2023-01-11 NOTE — PROGRESS NOTE ADULT - PROBLEM SELECTOR PROBLEM 1
Right-sided low back pain with sciatica
127

## 2023-01-11 NOTE — PROGRESS NOTE ADULT - REASON FOR ADMISSION
pyelonephritis/ intractable back pain, sciatica

## 2023-01-11 NOTE — DISCHARGE NOTE PROVIDER - NSDCMRMEDTOKEN_GEN_ALL_CORE_FT
Aleve 220 mg oral tablet: 1 tab(s) orally every 12 hours, As Needed  amLODIPine 5 mg oral tablet: 1 tab(s) orally once a day  aspirin 81 mg oral tablet: 1 tab(s) orally once a day  atorvastatin 80 mg oral tablet: 1 tab(s) orally once a day  ezetimibe 10 mg oral tablet: 1 tab(s) orally once a day  metoprolol succinate 50 mg oral tablet, extended release: 1 tab(s) orally once a day  pantoprazole 40 mg oral delayed release tablet: 1 tab(s) orally once a day  Vitamin D3 25 mcg (1000 intl units) oral tablet: 1 tab(s) orally once a day

## 2023-01-11 NOTE — DISCHARGE NOTE PROVIDER - NSDCCPCAREPLAN_GEN_ALL_CORE_FT
PRINCIPAL DISCHARGE DIAGNOSIS  Diagnosis: Pyelonephritis  Assessment and Plan of Treatment: A kidney infection, or pyelonephritis, is a bacterial infection. The infection usually starts in your bladder or urethra and moves into your kidney. One or both kidneys may be infected.   - this improved after IV antibiotics  . please follow up with your primary care provider in one week to have your urine rechecked in one week.   - monitor for the following signs/symptoms and contact your primary care provider if they occur Seek care immediately if:   You have a fever and chills. You cannot stop vomiting. You have severe pain in your abdomen, lower back, or sides. you have blood in your urine. You continue to have a fever after you take antibiotics. You have pain when you urinate, even after treatment.Your signs and symptoms return.      SECONDARY DISCHARGE DIAGNOSES  Diagnosis: Pain of back and right lower extremity  Assessment and Plan of Treatment:     Diagnosis: Back pain  Assessment and Plan of Treatment: Back Pain  WHAT YOU NEED TO KNOW:  Back pain is common. It can be caused by many conditions, such as arthritis or the breakdown of spinal discs. Your risk for back pain is increased by injuries, lack of activity, or repeated bending and twisting. You may feel sore or stiff on one or both sides of your back. The pain may spread to your buttocks or thighs.  DISCHARGE INSTRUCTIONS:  Return to the emergency department if:   You have pain, numbness, or weakness in one or both legs.  Your pain becomes so severe that you cannot walk.  You cannot control your urine or bowel movements.  You have severe back pain with chest pain.  You have severe back pain, nausea, and vomiting.  You have severe back pain that spreads to your side or genital area.  Contact your healthcare provider if:   You have back pain that does not get better with rest and pain medicine.  You have a fever.  You have pain that worsens when you are on your back or when you rest.  You have pain that worsens when you cough or sneeze.  You lose weight without trying.  You have questions or concerns about your condition or care.  Medicines:   NSAIDs help decrease swelling and pain. This medicine is available with or without a doctor's order. NSAIDs can cause stomach bleeding or kidney problems in certain people. If you take blood thinner medicine, always ask your healthcare provider if NSAIDs are safe for you. Always read the medicine label and follow directions.  Acetaminophen decreases pain and fever. It is available without a doctor's order. Ask how much to take and how often to take it. Follow directions. Read the labels of all other medicines you are using to see if they also contain acetaminophen, or ask your doctor or pharmacist. Acetaminophen can cause liver damage if not taken correctly. Do not use more than 4 grams (4,000 milligrams) total of acetaminophen in one day.   Muscle relaxers help decrease muscle spasms and back pain.  Prescription pain medicine may be given. Ask your healthcare

## 2023-01-11 NOTE — DISCHARGE NOTE PROVIDER - HOSPITAL COURSE
HPI:  68 y/o male w/ a PMHx of HTN, HLD, MI, and CAD x4 presents to the ED c/o lower back pain radiating to the right leg, right abd, and groin x4 days starting after bending over to  something. Pt states his right leg feels stiff and since this morning he has difficulty ambulating secondary to the pain/stiffness.  Denies any trauma, fall, or injury. Pt reports he had similar pain before it has never been this severe. Pt endorses taking Alleve w/o improvement. Denies bladder/bowel incontinence, urinary Sx, focal extremity weakness, CP, SOB, palpitations, or numbness No other complaints at this time. . Allergies: Penicillin and Sulfa from childhood told by his mother (06 Jan 2023 18:35)    Hospital course: pt admitted for acute pain / difficulty ambulating. MRI with L3 L4 impingement, L3 with disc buldge. pt evaled by ortho Spine Dr. Bazzi  intractable rt fem radic  MRI + HNP Rt L23 extraforaminal. right fem radic Intractable pain Right-sided low back pain with sciatica. options for treatment discussed w  pt  he has already tried IV steroids w/o relief. can try foraminal nerve block for HNP or consider surgery. he is interested in nerve block/epidural. for pyelonephritis -pt was treated with rocephin 1gm for 6 days with improvemenyt. pt to follow up at Dr. Bazzi today.     see progress note for PE, vitals, results of lab and imaging       #Pyelonephritis   -Completed 6 days of Rocephin   -UA: reviewed   -UCx: negative   -BCx: NGTD   -had enlarged prostate currently defers meds     #Back pain with sciatica   -s/p Solumedrol   -pain management   -warm and cold compresses   -minimal improvement in pain   -XR back: read pending  -MRI reviewed with patient   -PT consult and recommendations noted  -ortho spine consult and recommendations noted  -Possible outpatient steroid injection-patient preferred     #Leukocytosis   -likely secondary to Solumedrol     #CAD s/p stents   - cont ASA, statin, BB     #DVT prophylaxis   -encourage ambulation     Disposition: dc home today with follow up Dr. Bazzi    cc: back pain  HPI:  68 y/o male w/ a PMHx of HTN, HLD, MI, and CAD x4 presents to the ED c/o lower back pain radiating to the right leg, right abd, and groin x4 days starting after bending over to  something. Pt states his right leg feels stiff and since this morning he has difficulty ambulating secondary to the pain/stiffness.  Denies any trauma, fall, or injury. Pt reports he had similar pain before it has never been this severe. Pt endorses taking Alleve w/o improvement. Denies bladder/bowel incontinence, urinary Sx, focal extremity weakness, CP, SOB, palpitations, or numbness No other complaints at this time. . Allergies: Penicillin and Sulfa from childhood told by his mother (06 Jan 2023 18:35)    Hospital course: pt admitted for acute pain / difficulty ambulating. MRI with L3 L4 impingement, L3 with disc buldge. pt evaled by ortho Spine Dr. Bazzi  intractable rt fem radic  MRI + HNP Rt L23 extraforaminal. right fem radic Intractable pain Right-sided low back pain with sciatica. options for treatment discussed w  pt  he has already tried IV steroids w/o relief. can try foraminal nerve block for HNP or consider surgery. he is interested in nerve block/epidural. for pyelonephritis -pt was treated with rocephin 1gm for 6 days with improvemenyt. pt to follow up at Dr. Bazzi today.     see progress note for PE, vitals, results of lab and imaging       #Pyelonephritis   -Completed 6 days of Rocephin   -UA: reviewed   -UCx: negative   -BCx: NGTD   -had enlarged prostate currently defers meds     #Back pain with sciatica   -s/p Solumedrol   -pain management   -warm and cold compresses   -minimal improvement in pain   -XR back: read pending  -MRI reviewed with patient   -PT consult and recommendations noted  -ortho spine consult and recommendations noted  -Possible outpatient steroid injection-patient preferred     #Leukocytosis   -likely secondary to Solumedrol     #CAD s/p stents   - cont ASA, statin, BB     #DVT prophylaxis   -encourage ambulation     Disposition: dc home today with follow up Dr. Bazzi     Attending Attestation:   I agree with the assessment and plan of DEANGELO Wood as stated and discussed.

## 2023-01-11 NOTE — DISCHARGE NOTE PROVIDER - NSDCCAREPROVSEEN_GEN_ALL_CORE_FT
Pablo, Mary Oglesby, Sebastian Acosta, Kaden Sepulveda, Bobby Wood, Ewa Noel, [place], Audi Lucas, Josue Lobo, Zoltan Bazzi, Rey Beltran, [place]

## 2023-01-11 NOTE — DISCHARGE NOTE PROVIDER - NSDCPNSUBOBJ_GEN_ALL_CORE
All other systems and founds to be negative with exception of what has been described above.       Vital Signs Last 24 Hrs  T(C): 36.9 (11 Jan 2023 09:34), Max: 37.1 (10 Richard 2023 21:27)  T(F): 98.5 (11 Jan 2023 09:34), Max: 98.7 (10 Richard 2023 21:27)  HR: 78 (11 Jan 2023 09:34) (66 - 78)  BP: 124/64 (11 Jan 2023 09:34) (103/49 - 125/67)  BP(mean): 68 (10 Richard 2023 21:27) (68 - 68)  RR: 18 (11 Jan 2023 09:34) (16 - 18)  SpO2: 98% (11 Jan 2023 09:34) (92% - 98%)    Parameters below as of 11 Jan 2023 09:34  Patient On (Oxygen Delivery Method): room air        PHYSICAL EXAM:    GEN: NAD   HEENT: EOMI, normal hearing, moist mucous membranes  NECK : Soft and supple, no JVD  LUNG: CTABL, No wheezing, rales or rhonchi  CVS: S1S2+, RRR, no M/G/R  GI: BS+, soft, NT/ND, no guarding, no rebound  EXTREMITIES: No peripheral edema  VASCULAR: 2+ peripheral pulses  NEURO: AAOx3, grossly non-focal   MSK: 5/5 strength b/l upper and lower extremities, +ttp over the lower back, +SLR   SKIN: No rashes    MEDICATIONS:  MEDICATIONS  (STANDING):  amLODIPine   Tablet 5 milliGRAM(s) Oral daily  aspirin enteric coated 81 milliGRAM(s) Oral daily  atorvastatin 80 milliGRAM(s) Oral at bedtime  cefTRIAXone Injectable. 1000 milliGRAM(s) IV Push every 24 hours  influenza  Vaccine (HIGH DOSE) 0.7 milliLiter(s) IntraMuscular once  lidocaine   4% Patch 1 Patch Transdermal daily  metoprolol succinate ER 50 milliGRAM(s) Oral daily  naloxone Injectable 0.4 milliGRAM(s) IV Push once  pantoprazole    Tablet 40 milliGRAM(s) Oral before breakfast  polyethylene glycol 3350 17 Gram(s) Oral daily  senna 2 Tablet(s) Oral at bedtime  sodium chloride 0.9%. 1000 milliLiter(s) (125 mL/Hr) IV Continuous <Continuous>  sodium chloride 0.9%. 1000 milliLiter(s) (125 mL/Hr) IV Continuous <Continuous>    MEDICATIONS  (PRN):  acetaminophen     Tablet .. 650 milliGRAM(s) Oral every 6 hours PRN Mild Pain (1 - 3)  aluminum hydroxide/magnesium hydroxide/simethicone Suspension 30 milliLiter(s) Oral every 4 hours PRN Dyspepsia  bisacodyl 5 milliGRAM(s) Oral daily PRN Constipation  cyclobenzaprine 10 milliGRAM(s) Oral three times a day PRN Muscle Spasm  HYDROmorphone   Tablet 2 milliGRAM(s) Oral every 3 hours PRN Severe Pain (7 - 10)  melatonin 3 milliGRAM(s) Oral at bedtime PRN Insomnia  ondansetron Injectable 4 milliGRAM(s) IV Push every 8 hours PRN Nausea and/or Vomiting  oxyCODONE    IR 5 milliGRAM(s) Oral every 3 hours PRN Mild Pain (1 - 3)  oxyCODONE    IR 10 milliGRAM(s) Oral every 3 hours PRN Moderate Pain (4 - 6)        LABS: All Labs Reviewed:                            13.6   11.75 )-----------( 189      ( 09 Jan 2023 08:39 )             41.6     09 Jan 2023 08:39    142    |  109    |  31     ----------------------------<  100    3.7     |  28     |  1.01     Ca    8.7        09 Jan 2023 08:39        PT/INR - ( 09 Jan 2023 08:39 )   PT: 13.1 sec;   INR: 1.13 ratio         PTT - ( 09 Jan 2023 08:39 )  PTT:26.4 sec  CAPILLARY BLOOD GLUCOSE        < from: MR Lumbar Spine No Cont (01.09.23 @ 13:02) >    FINDINGS:    There is straightening of the normal lordotic curvature.    Vertebral bodies are normal in height and signal without fracture or   dislocation. There are scattered rounded hemangiomas at T12, L2, L3 and   L4.    There is loss of normal disc signal intensity within the disc spaces.    Evaluation of individual levels:    L1-L2:  Unremarkable.    L2-L3:  Mild broad-based annular bulge with mild bilateral facet   hypertrophy resulting in mild AP and transverse canal stenosis.   Mild-to-moderate right-sided neural foraminal stenosis. Mild left-sided   neural foraminal stenosis.    L3-L4:  Mild loss of posterior disc height with mild broad-based annular   bulge. Mild AP and transverse canal stenosis. Small bilateral facet joint   effusions. Mild impingement of the exiting bilateral L3 nerve roots. Best   seen on images 2-4 and 2-12. Small far right lateral annular tear.    L4-L5:  Minimal loss of posterior disc height. Broad-based disc bulge   with mild bilateral facet and ligamentous hypertrophy. Mild bilateral   lateral recess stenosis. Mild-to-moderate bilateral neural foraminal   stenosis.    L5-S1:  Moderate loss of mostly posterior disc height. Mild bilateral   facet hypertrophy. No significant canal or neural foraminal stenosis.    The conus is normal in position and morphology at the T12-L1 level.    There is no definite fluid collection or mass lesion within the   visualized retroperitoneal or posterior paraspinal soft tissues. There is   diffuse mostly midline fat edema from L1 through L5.    IMPRESSION:    There is suggestion of mild impingement of bilateral L3 exiting nerve   roots at the L3-L4 level. There is a small far right lateral annular   tear. Mild broad-based disc bulge with mild canal stenosis. Small   bilateral facet joint effusions.    --- End of Report ---            CASSANDRA MENDOZA MD; Attending Radiologist  This document has been electronically signed. Jan 9 2023  1:31PM    < end of copied text >    < from: MR Lumbar Spine No Cont (01.09.23 @ 13:02) >    FINDINGS:    There is straightening of the normal lordotic curvature.    Vertebral bodies are normal in height and signal without fracture or   dislocation. There are scattered rounded hemangiomas at T12, L2, L3 and   L4.    There is loss of normal disc signal intensity within the disc spaces.    Evaluation of individual levels:    L1-L2:  Unremarkable.    L2-L3:  Mild broad-based annular bulge with mild bilateral facet   hypertrophy resulting in mild AP and transverse canal stenosis.   Mild-to-moderate right-sided neural foraminal stenosis. Mild left-sided   neural foraminal stenosis.    L3-L4:  Mild loss of posterior disc height with mild broad-based annular   bulge. Mild AP and transverse canal stenosis. Small bilateral facet joint   effusions. Mild impingement of the exiting bilateral L3 nerve roots. Best   seen on images 2-4 and 2-12. Small far right lateral annular tear.    L4-L5:  Minimal loss of posterior disc height. Broad-based disc bulge   with mild bilateral facet and ligamentous hypertrophy. Mild bilateral   lateral recess stenosis. Mild-to-moderate bilateral neural foraminal   stenosis.    L5-S1:  Moderate loss of mostly posterior disc height. Mild bilateral   facet hypertrophy. No significant canal or neural foraminal stenosis.    The conus is normal in position and morphology at the T12-L1 level.    There is no definite fluid collection or mass lesion within the   visualized retroperitoneal or posterior paraspinal soft tissues. There is   diffuse mostly midline fat edema from L1 through L5.    IMPRESSION:    There is suggestion of mild impingement of bilateral L3 exiting nerve   roots at the L3-L4 level. There is a small far right lateral annular   tear. Mild broad-based disc bulge with mild canal stenosis. Small   bilateral facet joint effusions.    --- End of Report ---            CASSANDRA MENDOZA MD; Attending Radiologist  This document has been electronically signed. Jan 9 2023  1:31PM    < end of copied text >

## 2023-01-17 DIAGNOSIS — I10 ESSENTIAL (PRIMARY) HYPERTENSION: ICD-10-CM

## 2023-01-17 DIAGNOSIS — Z91.041 RADIOGRAPHIC DYE ALLERGY STATUS: ICD-10-CM

## 2023-01-17 DIAGNOSIS — M54.10 RADICULOPATHY, SITE UNSPECIFIED: ICD-10-CM

## 2023-01-17 DIAGNOSIS — E78.5 HYPERLIPIDEMIA, UNSPECIFIED: ICD-10-CM

## 2023-01-17 DIAGNOSIS — M48.061 SPINAL STENOSIS, LUMBAR REGION WITHOUT NEUROGENIC CLAUDICATION: ICD-10-CM

## 2023-01-17 DIAGNOSIS — N12 TUBULO-INTERSTITIAL NEPHRITIS, NOT SPECIFIED AS ACUTE OR CHRONIC: ICD-10-CM

## 2023-01-17 DIAGNOSIS — N40.0 BENIGN PROSTATIC HYPERPLASIA WITHOUT LOWER URINARY TRACT SYMPTOMS: ICD-10-CM

## 2023-01-17 DIAGNOSIS — Z79.82 LONG TERM (CURRENT) USE OF ASPIRIN: ICD-10-CM

## 2023-01-17 DIAGNOSIS — M25.48 EFFUSION, OTHER SITE: ICD-10-CM

## 2023-01-17 DIAGNOSIS — Z95.5 PRESENCE OF CORONARY ANGIOPLASTY IMPLANT AND GRAFT: ICD-10-CM

## 2023-01-17 DIAGNOSIS — I25.10 ATHEROSCLEROTIC HEART DISEASE OF NATIVE CORONARY ARTERY WITHOUT ANGINA PECTORIS: ICD-10-CM

## 2023-01-17 DIAGNOSIS — D72.829 ELEVATED WHITE BLOOD CELL COUNT, UNSPECIFIED: ICD-10-CM

## 2023-01-17 DIAGNOSIS — I25.2 OLD MYOCARDIAL INFARCTION: ICD-10-CM

## 2023-01-17 DIAGNOSIS — F40.240 CLAUSTROPHOBIA: ICD-10-CM

## 2023-01-17 DIAGNOSIS — Z88.0 ALLERGY STATUS TO PENICILLIN: ICD-10-CM

## 2023-08-22 ENCOUNTER — OFFICE (OUTPATIENT)
Dept: URBAN - METROPOLITAN AREA CLINIC 12 | Facility: CLINIC | Age: 68
Setting detail: OPHTHALMOLOGY
End: 2023-08-22
Payer: COMMERCIAL

## 2023-08-22 DIAGNOSIS — H01.004: ICD-10-CM

## 2023-08-22 DIAGNOSIS — H43.813: ICD-10-CM

## 2023-08-22 DIAGNOSIS — H02.831: ICD-10-CM

## 2023-08-22 DIAGNOSIS — H52.4: ICD-10-CM

## 2023-08-22 DIAGNOSIS — H35.40: ICD-10-CM

## 2023-08-22 DIAGNOSIS — H10.45: ICD-10-CM

## 2023-08-22 DIAGNOSIS — H02.834: ICD-10-CM

## 2023-08-22 DIAGNOSIS — H25.13: ICD-10-CM

## 2023-08-22 DIAGNOSIS — H01.001: ICD-10-CM

## 2023-08-22 DIAGNOSIS — H35.033: ICD-10-CM

## 2023-08-22 DIAGNOSIS — H43.393: ICD-10-CM

## 2023-08-22 PROBLEM — H52.7 REFRACTIVE ERROR: Status: ACTIVE | Noted: 2023-08-22

## 2023-08-22 PROCEDURE — 92004 COMPRE OPH EXAM NEW PT 1/>: CPT | Performed by: OPTOMETRIST

## 2023-08-22 PROCEDURE — 92015 DETERMINE REFRACTIVE STATE: CPT | Performed by: OPTOMETRIST

## 2023-08-22 PROCEDURE — 92202 OPSCPY EXTND ON/MAC DRAW: CPT | Performed by: OPTOMETRIST

## 2023-08-22 ASSESSMENT — KERATOMETRY
OS_K1POWER_DIOPTERS: 44.00
OS_AXISANGLE_DEGREES: 174
OD_K2POWER_DIOPTERS: 45.75
OS_K2POWER_DIOPTERS: 44.75
OD_AXISANGLE_DEGREES: 097
METHOD_AUTO_MANUAL: AUTO
OD_K1POWER_DIOPTERS: 44.25

## 2023-08-22 ASSESSMENT — REFRACTION_MANIFEST
OS_CYLINDER: -0.75
OD_AXIS: 080
OS_VA1: 20/25
OS_ADD: +2.50
OD_VA1: 20/25
OD_ADD: +2.50
OD_CYLINDER: -0.75
OS_AXIS: 085
OD_SPHERE: -2.75
OS_SPHERE: -2.25

## 2023-08-22 ASSESSMENT — REFRACTION_AUTOREFRACTION
OD_AXIS: 032
OS_CYLINDER: -1.25
OD_CYLINDER: -0.75
OS_AXIS: 083
OD_SPHERE: -2.75
OS_SPHERE: -1.50

## 2023-08-22 ASSESSMENT — SPHEQUIV_DERIVED
OD_SPHEQUIV: -3.125
OD_SPHEQUIV: -3.125
OS_SPHEQUIV: -2.625
OS_SPHEQUIV: -2.125

## 2023-08-22 ASSESSMENT — REFRACTION_CURRENTRX
OS_SPHERE: -3.50
OS_AXIS: 098
OD_ADD: +2.25
OS_ADD: +2.25
OS_VPRISM_DIRECTION: PROGS
OD_OVR_VA: 20/
OS_CYLINDER: -0.50
OD_SPHERE: -3.50
OD_VPRISM_DIRECTION: PROGS
OD_CYLINDER: -0.50
OS_OVR_VA: 20/
OD_AXIS: 090

## 2023-08-22 ASSESSMENT — CONFRONTATIONAL VISUAL FIELD TEST (CVF)
OS_FINDINGS: FULL
OD_FINDINGS: FULL

## 2023-08-22 ASSESSMENT — AXIALLENGTH_DERIVED
OD_AL: 24.2778
OD_AL: 24.2778
OS_AL: 24.3147
OS_AL: 24.1095

## 2023-08-22 ASSESSMENT — LID EXAM ASSESSMENTS
OD_DERMATOCHALASIS: 1+
OS_BLEPHARITIS: LUL 1+
OS_DERMATOCHALASIS: 1+
OD_BLEPHARITIS: RUL 1+

## 2023-08-22 ASSESSMENT — VISUAL ACUITY
OS_BCVA: 20/30-1
OD_BCVA: 20/25-1

## 2023-09-26 ENCOUNTER — OFFICE (OUTPATIENT)
Dept: URBAN - METROPOLITAN AREA CLINIC 12 | Facility: CLINIC | Age: 68
Setting detail: OPHTHALMOLOGY
End: 2023-09-26
Payer: COMMERCIAL

## 2023-09-26 DIAGNOSIS — H43.393: ICD-10-CM

## 2023-09-26 DIAGNOSIS — H35.40: ICD-10-CM

## 2023-09-26 DIAGNOSIS — H01.004: ICD-10-CM

## 2023-09-26 DIAGNOSIS — H43.813: ICD-10-CM

## 2023-09-26 DIAGNOSIS — H25.13: ICD-10-CM

## 2023-09-26 DIAGNOSIS — H01.001: ICD-10-CM

## 2023-09-26 DIAGNOSIS — H02.834: ICD-10-CM

## 2023-09-26 DIAGNOSIS — H35.033: ICD-10-CM

## 2023-09-26 DIAGNOSIS — H10.45: ICD-10-CM

## 2023-09-26 DIAGNOSIS — H02.831: ICD-10-CM

## 2023-09-26 PROCEDURE — 99213 OFFICE O/P EST LOW 20 MIN: CPT | Performed by: OPTOMETRIST

## 2023-09-26 PROCEDURE — 92250 FUNDUS PHOTOGRAPHY W/I&R: CPT | Performed by: OPTOMETRIST

## 2023-09-26 ASSESSMENT — LID EXAM ASSESSMENTS
OS_DERMATOCHALASIS: 1+
OD_BLEPHARITIS: RUL 1+
OS_BLEPHARITIS: LUL 1+
OD_DERMATOCHALASIS: 1+

## 2023-09-26 ASSESSMENT — SPHEQUIV_DERIVED
OD_SPHEQUIV: -2.625
OS_SPHEQUIV: -2.625
OD_SPHEQUIV: -3.125
OS_SPHEQUIV: -2.125

## 2023-09-26 ASSESSMENT — REFRACTION_CURRENTRX
OD_CYLINDER: -0.50
OS_ADD: +2.25
OS_OVR_VA: 20/
OD_SPHERE: -3.50
OD_AXIS: 090
OD_ADD: +2.25
OD_OVR_VA: 20/
OS_CYLINDER: -0.50
OS_AXIS: 098
OS_SPHERE: -3.50
OD_VPRISM_DIRECTION: PROGS
OS_VPRISM_DIRECTION: PROGS

## 2023-09-26 ASSESSMENT — KERATOMETRY
METHOD_AUTO_MANUAL: AUTO
OD_AXISANGLE_DEGREES: 098
OS_K1POWER_DIOPTERS: 44.00
OS_K2POWER_DIOPTERS: 44.25
OS_AXISANGLE_DEGREES: 171
OD_K2POWER_DIOPTERS: 44.75
OD_K1POWER_DIOPTERS: 44.25

## 2023-09-26 ASSESSMENT — REFRACTION_AUTOREFRACTION
OS_CYLINDER: -1.25
OS_AXIS: 083
OD_SPHERE: -2.25
OD_CYLINDER: -0.75
OS_SPHERE: -1.50
OD_AXIS: 025

## 2023-09-26 ASSESSMENT — CONFRONTATIONAL VISUAL FIELD TEST (CVF)
OS_FINDINGS: FULL
OD_FINDINGS: FULL

## 2023-09-26 ASSESSMENT — AXIALLENGTH_DERIVED
OS_AL: 24.2058
OS_AL: 24.4126
OD_AL: 24.4739
OD_AL: 24.266

## 2023-09-26 ASSESSMENT — VISUAL ACUITY
OS_BCVA: 20/20
OD_BCVA: 20/20

## 2023-09-26 ASSESSMENT — TONOMETRY
OS_IOP_MMHG: 19
OD_IOP_MMHG: 21

## 2023-09-26 ASSESSMENT — REFRACTION_MANIFEST
OD_ADD: +2.50
OD_VA1: 20/25
OS_ADD: +2.50
OS_SPHERE: -2.25
OS_AXIS: 085
OS_CYLINDER: -0.75
OS_VA1: 20/25
OD_CYLINDER: -0.75
OD_AXIS: 080
OD_SPHERE: -2.75

## 2024-04-02 ENCOUNTER — OFFICE (OUTPATIENT)
Dept: URBAN - METROPOLITAN AREA CLINIC 88 | Facility: CLINIC | Age: 69
Setting detail: OPHTHALMOLOGY
End: 2024-04-02
Payer: MEDICARE

## 2024-04-02 DIAGNOSIS — H35.033: ICD-10-CM

## 2024-04-02 DIAGNOSIS — H43.813: ICD-10-CM

## 2024-04-02 DIAGNOSIS — H35.40: ICD-10-CM

## 2024-04-02 DIAGNOSIS — H35.372: ICD-10-CM

## 2024-04-02 DIAGNOSIS — H43.393: ICD-10-CM

## 2024-04-02 PROCEDURE — 92014 COMPRE OPH EXAM EST PT 1/>: CPT | Performed by: OPHTHALMOLOGY

## 2024-04-02 PROCEDURE — 92134 CPTRZ OPH DX IMG PST SGM RTA: CPT | Performed by: OPHTHALMOLOGY

## 2024-04-02 PROCEDURE — 92201 OPSCPY EXTND RTA DRAW UNI/BI: CPT | Performed by: OPHTHALMOLOGY

## 2024-04-02 ASSESSMENT — LID EXAM ASSESSMENTS
OS_BLEPHARITIS: LUL 1+
OS_DERMATOCHALASIS: 1+
OD_BLEPHARITIS: RUL 1+
OD_DERMATOCHALASIS: 1+

## 2024-10-02 ENCOUNTER — OFFICE (OUTPATIENT)
Dept: URBAN - METROPOLITAN AREA CLINIC 88 | Facility: CLINIC | Age: 69
Setting detail: OPHTHALMOLOGY
End: 2024-10-02
Payer: MEDICARE

## 2024-10-02 DIAGNOSIS — H43.813: ICD-10-CM

## 2024-10-02 DIAGNOSIS — H35.033: ICD-10-CM

## 2024-10-02 DIAGNOSIS — H35.40: ICD-10-CM

## 2024-10-02 DIAGNOSIS — H43.393: ICD-10-CM

## 2024-10-02 DIAGNOSIS — H35.372: ICD-10-CM

## 2024-10-02 PROCEDURE — 92014 COMPRE OPH EXAM EST PT 1/>: CPT | Performed by: OPHTHALMOLOGY

## 2024-10-02 PROCEDURE — 92134 CPTRZ OPH DX IMG PST SGM RTA: CPT | Performed by: OPHTHALMOLOGY

## 2024-10-02 ASSESSMENT — KERATOMETRY
OS_AXISANGLE_DEGREES: 171
OD_K1POWER_DIOPTERS: 44.25
OS_K1POWER_DIOPTERS: 44.00
OD_AXISANGLE_DEGREES: 098
METHOD_AUTO_MANUAL: AUTO
OS_K2POWER_DIOPTERS: 44.25
OD_K2POWER_DIOPTERS: 44.75

## 2024-10-02 ASSESSMENT — CONFRONTATIONAL VISUAL FIELD TEST (CVF)
OS_FINDINGS: FULL
OD_FINDINGS: FULL

## 2024-10-02 ASSESSMENT — LID EXAM ASSESSMENTS
OS_BLEPHARITIS: LUL 1+
OS_DERMATOCHALASIS: 1+
OD_BLEPHARITIS: RUL 1+
OD_DERMATOCHALASIS: 1+

## 2024-10-02 ASSESSMENT — REFRACTION_AUTOREFRACTION
OS_SPHERE: -1.50
OD_SPHERE: -2.25
OD_CYLINDER: -0.75
OS_CYLINDER: -1.25
OS_AXIS: 083
OD_AXIS: 025

## 2024-10-02 ASSESSMENT — VISUAL ACUITY
OD_BCVA: 20/25-2
OS_BCVA: 20/25

## 2024-10-02 ASSESSMENT — TONOMETRY
OS_IOP_MMHG: 14
OD_IOP_MMHG: 15